# Patient Record
Sex: FEMALE | Race: BLACK OR AFRICAN AMERICAN | NOT HISPANIC OR LATINO | Employment: FULL TIME | URBAN - METROPOLITAN AREA
[De-identification: names, ages, dates, MRNs, and addresses within clinical notes are randomized per-mention and may not be internally consistent; named-entity substitution may affect disease eponyms.]

---

## 2019-04-12 ENCOUNTER — ALLIED HEALTH/NURSE VISIT (OUTPATIENT)
Dept: SURGERY | Facility: CLINIC | Age: 34
End: 2019-04-12
Payer: COMMERCIAL

## 2019-04-12 ENCOUNTER — OFFICE VISIT (OUTPATIENT)
Dept: ENDOCRINOLOGY | Facility: CLINIC | Age: 34
End: 2019-04-12
Payer: COMMERCIAL

## 2019-04-12 VITALS
HEART RATE: 83 BPM | SYSTOLIC BLOOD PRESSURE: 111 MMHG | WEIGHT: 205.9 LBS | BODY MASS INDEX: 34.3 KG/M2 | DIASTOLIC BLOOD PRESSURE: 60 MMHG | TEMPERATURE: 98.1 F | OXYGEN SATURATION: 100 % | RESPIRATION RATE: 18 BRPM | HEIGHT: 65 IN

## 2019-04-12 DIAGNOSIS — E66.811 OBESITY (BMI 30.0-34.9): Primary | ICD-10-CM

## 2019-04-12 RX ORDER — TOPIRAMATE 25 MG/1
TABLET, FILM COATED ORAL
Qty: 90 TABLET | Refills: 3 | Status: SHIPPED | OUTPATIENT
Start: 2019-04-12 | End: 2019-10-11

## 2019-04-12 RX ORDER — PHENTERMINE HYDROCHLORIDE 37.5 MG/1
TABLET ORAL
Qty: 15 TABLET | Refills: 3 | Status: SHIPPED | OUTPATIENT
Start: 2019-04-12 | End: 2019-10-04

## 2019-04-12 SDOH — HEALTH STABILITY: MENTAL HEALTH: HOW OFTEN DO YOU HAVE A DRINK CONTAINING ALCOHOL?: MONTHLY OR LESS

## 2019-04-12 ASSESSMENT — PAIN SCALES - GENERAL: PAINLEVEL: NO PAIN (0)

## 2019-04-12 ASSESSMENT — MIFFLIN-ST. JEOR: SCORE: 1634.84

## 2019-04-12 NOTE — NURSING NOTE
"Chief Complaint   Patient presents with     Weight Problem     Pt here for consult with weight management       Vitals:    04/12/19 0848   BP: 111/60   BP Location: Left arm   Patient Position: Sitting   Cuff Size: Adult Large   Pulse: 83   Resp: 18   Temp: 98.1  F (36.7  C)   TempSrc: Oral   SpO2: 100%   Weight: 93.4 kg (205 lb 14.4 oz)   Height: 1.651 m (5' 5\")       Body mass index is 34.26 kg/m .      ROOSEVELT EdmondsonT                      "
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
## 2019-04-12 NOTE — PATIENT INSTRUCTIONS
Nutrition Goals  1) Eat three meals per day, follow the plate method for portion sizes  2) If you are hungry between meals have a planned 100 calorie snack  3) Drink 48-64 oz of water per day  4) Continue exercising 3-4 days per week for 60 minutes    Follow up with RD in one month    Marixa Staton RD, LD  If you need to schedule or reschedule with a dietitian please call [phone].

## 2019-04-12 NOTE — LETTER
Date:April 30, 2019      Patient was self referred, no letter generated. Do not send.        [facility] Information

## 2019-04-12 NOTE — PATIENT INSTRUCTIONS
Welcome to our weight management program!   We are excited to join you on your weight loss journey    Thank you for allowing us the privilege of caring for you. We hope we provided you with the excellent service you deserve.    Please let us know if there is anything else we can do so that we can be sure you are leaving completely satisfied with your care experience.    You saw Dr. Alonso today.    Instructions per today's visit:   Medications started today: topiramate and phentermine (see below)  MTM pharmacist referral: in 1 month  Nutrition today    Follow up appointments:  3 months with Gavin; MTM with Bloch in 4 wks; nurse visit in 2 wks for blood pressure check    For any questions/concerns contact Helena Beckwith LPN at [phone] or Johanna Francisco RN at [phone]    To schedule appointments with our team, please call [phone]     Please call during clinic hours Monday through Friday 8:00a - 4:00p if you have questions or you can contact us via [employer] at anytime. ?    Lab results will be communicated through My Chart or letter (if My Chart not used). Please call the clinic if you have not received communication after 1 week or if you have any questions.?      Fax: [phone]    Thank you,  Medical Weight Management Team      MEDICATION STARTED AT THIS APPOINTMENT  We are starting topiramate at bedtime or supper.  Start one tab, 25 mg, for a week. Go up to 50 mg (2 tabs) for the next week. At the third week, take   3 tabs (75 mg).  Stay at 3 tabs until you are seen again. Call the nurse at [phone] if you have any questions or concerns. (Do not stop taking it if you don't think it's working. For some people it works even though they do not feel much different.)    Topiramate (Topamax) is a medication that is used most often to treat migraine headaches or for seizures. It has also been found to help with weight loss. Although it's not currently FDA approved for weight loss, it has been used  safely for a number of years to help people who are carrying extra weight.     Just how topiramate helps with weight loss has not been exactly determined. However it seems to work on areas of the brain to quiet down signals related to eating.      Topiramate may make you:    >feel less interest in eating in between meals   >think less about food and eating   >find it easier to push the plate away   >find giving up pop easier    >have an easier time eating less    For some of our patients, the pills work right away. They feel and think quite differently about food. Other patients don't feel much of a change but find in fact they have lost weight! Like all weight loss medications, topiramate works best when you help it work.  This means:    1) Have less tempting high calorie (fattening) food around the house or office    2) Have lower calorie food (fruits, vegetables,low fat meats and dairy) for snacks    3) Eat out only one time or less each week.   4) Eat your meals at a table with the TV or computer off.    Side-effects. Topiramate is generally well tolerated. The main side-effects we see are:   Tingling in hands,feet, or face (usually not very troublesome)   Mental confusion and word finding trouble (about 10% of patients have this.)     Feeling sleepy or a bit dopey- this goes away very soon after starting.    One of the dangers of topiramate is the possibility of birth defects--if you get pregnant when you are on it, there is the risk that your baby will be born with a cleft lip or palate.  If you are on topiramate and of child bearing age, you need to be on a reliable form of birth control or refrain from sexual intercourse.     Please refer to the pharmacy insert for more information on side-effects. Since many pharmacists are not familiar with the use of topiramate in weight loss, calling the clinic will get you the most accurate information on the use of this medication for weight loss.     In order to get  refills of this or any medication we prescribe you must be seen in the medical weight mgmt clinic every 2-3 months. Please have your pharmacy fax a refill request to [phone].      MEDICATION STARTED AT THIS APPOINTMENT    We are starting Phentermine. Take one-half tablet in the morning.  Call the nurse at [phone] if you have any questions or concerns. (Do not stop taking it if you don't think it's working. For some people it works without them knowing it.)    Phentermine is being prescribed because you identified hunger as one of the main causes for your extra weight.      Our patients on Phentermine find that they:    >feel less hunger    >find it easier to push the plate away   >have an easier time eating less    For some of our patients, these feelings are very real and immediate. For other patients, the feelings are less obvious. They don't feel much of a change but find they've lost weight. Like all weight loss medications, Phentermine  works best when you help it work. This means:  1. Having less tempting high calorie (fattening) food around the house or office. (For people with strong cravings this is very important.)   2. Staying away from situations or people that may trigger your cravings .   3. Eating out only one time or less each week.  4. Eating your meals at a table with the TV or computer off.    Side-effects. Phentermine is generally well tolerated. The main side-effects we see are feelings of racing pulse or rapid heart beat. Some people can get an elevated blood pressure. Because of this we may have you come back within a week or so of starting the medication for a blood pressure check.         In order to get refills of this or any medication we prescribe you must be seen in the medical weight mgmt clinic every 2-3 months. Please have your pharmacy fax a refill request to [phone].

## 2019-04-12 NOTE — PROGRESS NOTES
"Matt Gao is a 33 year old female presents today for new weight management nutrition consultation.  Patient was referred by Dr Alonso.    Estimated body mass index is 34.26 kg/m  as calculated from the following:    Height as of an earlier encounter on 4/12/19: 1.651 m (5' 5\").    Weight as of an earlier encounter on 4/12/19: 93.4 kg (205 lb 14.4 oz).     Nutrition history  See MD note for details.  Recent food recall:  Breakfast: skips - intermittent fasting eats 12pm-8pm  Lunch(12): protein porridge(protein world) with blueberries or almond milk, rice, chicken, sweet potatoes  Dinner(7): protein shake(protein world) with almond milk and occ fruit or kale  Snacks: chips and queso, popcorn, candy, gardettos, chex mix  Beverages: water, occ wine(once per week, 2-3 glasses)     Turns to food when stressed or upset  Patient is getting  in August and started working a second job in the last year, but is able to control hours and how much she works her second job.    Exercise: 3 days per week for 60 minutes    Nutrition Prescription  Decrease carbohydrate, increased lean protein (per MD)    Nutrition Diagnosis  Food and nutrition related knowledge deficit r/t lack of prior exposure to nutrition education aeb pt unable to verbalize understanding of portion sizes and meal/snack planning for weight loss.    Nutrition Intervention  Materials/education provided on portion control and healthy food choices (The plate method), 100 calorie snack choices.  Patient will snack during the day at work and reported consuming large portions of carbohydrates at meals.  Discussed having planned snacks of 100 calories available if hungry between meals.  Encouraged including lean protein at each meal and reducing carbohydrate portions.  Patient asked how much water she should be drinking encouraged 48-64 oz per day.  She currently goes to the gym 3 times per week since January and has not seen weight loss, discussed food " portions to help improve weight loss results.  Provided encouragement, support and RD contact information.    Patient Understanding: good  Expected Compliance: good  Follow-Up Plans: calorie goal/what to eat.     Nutrition Goals  1) Eat three meals per day, follow the plate method for portion sizes  2) If you are hungry between meals have a planned 100 calorie snack  3) Drink 48-64 oz of water per day  4) Continue exercising 3-4 days per week for 60 minutes    Follow-Up:  PRN    Time spent with patient: 30 minutes.  Marixa Staton, MARGIE, LD

## 2019-04-16 ENCOUNTER — TELEPHONE (OUTPATIENT)
Dept: PHARMACY | Facility: OTHER | Age: 34
End: 2019-04-16

## 2019-04-16 NOTE — TELEPHONE ENCOUNTER
MTM referral from: [facility] visit (referral by provider)    MTM referral outreach attempt #2 on April 16, 2019 at 3:00 PM      Outcome: Patient not reachable after several attempts, will route to MTM Pharmacist/Provider as an FYI. Thank you for the referral.    Hawa Dillon, MTM Coordinator

## 2019-04-19 ENCOUNTER — HEALTH MAINTENANCE LETTER (OUTPATIENT)
Age: 34
End: 2019-04-19

## 2019-04-25 ENCOUNTER — ALLIED HEALTH/NURSE VISIT (OUTPATIENT)
Dept: SURGERY | Facility: CLINIC | Age: 34
End: 2019-04-25
Payer: COMMERCIAL

## 2019-04-25 VITALS
SYSTOLIC BLOOD PRESSURE: 105 MMHG | HEART RATE: 66 BPM | WEIGHT: 196.8 LBS | DIASTOLIC BLOOD PRESSURE: 69 MMHG | BODY MASS INDEX: 32.75 KG/M2

## 2019-04-25 DIAGNOSIS — E66.9 OBESITY: Primary | ICD-10-CM

## 2019-04-25 NOTE — NURSING NOTE
Patient here today for weight, BP and pulse check per Dr. Alonso. Patient is currently taking Phentermine 18.75mg and Topiramate 50mg. Patient states she is having no side effects from either medication. She is planning on increasing Topiramate to 75mg this weekend. Patient states she is having occasional cravings after dinner and is still slightly groggy in the morning. Advised patient to try moving Topiramate dose to around dinner time to see if things improve. Patient understands and has no further questions. Patient is down 9lbs since initial visit on 4/12/19. Will inform Dr. Alonso.

## 2019-05-08 NOTE — PROGRESS NOTES
"Matt Gao is a 33 year old female presents today for return weight management nutrition consultation.  Patient was referred by Dr Alonso.    Estimated body mass index is 34.26 kg/m  as calculated from the following:    Height as of an earlier encounter on 4/12/19: 1.651 m (5' 5\").    Weight as of an earlier encounter on 4/12/19: 93.4 kg (205 lb 14.4 oz).   Current weight: 194.9 lbs (-11 lbs in the past month)      Nutrition history  Recent food recall:  Breakfast(12): oatmeal or pancakes with protein  Lunch: popcorn, yogurt, avocado, pepper  Dinner: skips  Snacks: chips occasionally   Beverages: water(not enough)     If eating out(fast food) will only have one meal per day  Not eating after 8 once she gets home from work    Progress with previous goals:  1) Eat three meals per day, follow the plate method for portion sizes continues   2) If you are hungry between meals have a planned 100 calorie snack not snacking as often   3) Drink 48-64 oz of water per day not enough   4) Continue exercising 3-4 days per week for 60 minutes at least 3 days for 60 minutes     Turns to food when stressed or upset  Patient is getting  in August and started working a second job in the last year, but is able to control hours and how much she works her second job.    Exercise: 3 days per week for 60 minutes    Nutrition Prescription  Decrease carbohydrate, increased lean protein (per MD)    Nutrition Diagnosis  Food and nutrition related knowledge deficit r/t lack of prior exposure to nutrition education aeb pt unable to verbalize understanding of portion sizes and meal/snack planning for weight loss.    Nutrition Intervention  Materials/education provided, reviewed previous goals.  Patient reported that she has decreased her snacking and portions at meal are more controlled however continues to be inconsistent with regular meals.  Discussed moving intermittent fasting earlier in the day to avoid additional calories in " the evenings.  She continues to consistently exercise 3 days per week.  Discussed ways to increase water intake as patient does not think she is drinking enough water.  Provided encouragement, support and RD contact information.    Patient Understanding: good  Expected Compliance: good  Follow-Up Plans: calorie goal/what to eat.     Nutrition Goals  1) Eat three meals per day, follow the plate method for portion sizes, shift intermittent fasting up earlier in the day, eat between 10 am and 6 pm  2) If you are hungry between meals have a planned 100 calorie snack  3) Drink 48-64 oz of water per day.  Take two bottles/pitchers of water to work  4) Continue exercising 3-4 days per week for 60 minutes    Follow-Up:  PRN    Time spent with patient: 15 minutes.  Marixa Staton, MARGIE, LD

## 2019-05-10 ENCOUNTER — TELEPHONE (OUTPATIENT)
Dept: ENDOCRINOLOGY | Facility: CLINIC | Age: 34
End: 2019-05-10

## 2019-05-10 ENCOUNTER — ALLIED HEALTH/NURSE VISIT (OUTPATIENT)
Dept: SURGERY | Facility: CLINIC | Age: 34
End: 2019-05-10
Payer: COMMERCIAL

## 2019-05-10 VITALS — WEIGHT: 194.9 LBS | BODY MASS INDEX: 32.43 KG/M2

## 2019-05-10 NOTE — PATIENT INSTRUCTIONS
Nutrition Goals  1) Eat three meals per day, follow the plate method for portion sizes, shift intermittent fasting up earlier in the day, eat between 10 am and 6 pm  2) If you are hungry between meals have a planned 100 calorie snack  3) Drink 48-64 oz of water per day.  Take two bottles/pitchers of water to work  4) Continue exercising 3-4 days per week for 60 minutes    Follow up with RD in one month    Marixa Staton RD, LD  If you need to schedule or reschedule with a dietitian please call [phone].

## 2019-05-10 NOTE — TELEPHONE ENCOUNTER
Called and left message for patient in regards to medication check. Patient was seen for nurse visit last month, was still titrating Topiramate dose. Left direct number for call back to see how things are going.

## 2019-08-17 DIAGNOSIS — E66.811 OBESITY (BMI 30.0-34.9): ICD-10-CM

## 2019-08-20 RX ORDER — TOPIRAMATE 25 MG/1
TABLET, FILM COATED ORAL
Qty: 90 TABLET | Refills: 3 | OUTPATIENT
Start: 2019-08-20

## 2019-08-20 NOTE — TELEPHONE ENCOUNTER
Recieved refill request for Topiramate. Patient needs appointment scheduled prior to any refills. Clinic Coordinator notified and will follow up with the patient as appropriate. The pharmacy has been notified that the medication will not be refilled prior to an appointment being scheduled.  LVD 4/12/2019

## 2019-10-04 ENCOUNTER — OFFICE VISIT (OUTPATIENT)
Dept: ENDOCRINOLOGY | Facility: CLINIC | Age: 34
End: 2019-10-04
Payer: COMMERCIAL

## 2019-10-04 VITALS
TEMPERATURE: 98.3 F | BODY MASS INDEX: 31.75 KG/M2 | SYSTOLIC BLOOD PRESSURE: 117 MMHG | OXYGEN SATURATION: 100 % | HEIGHT: 65 IN | WEIGHT: 190.6 LBS | DIASTOLIC BLOOD PRESSURE: 70 MMHG | HEART RATE: 73 BPM

## 2019-10-04 DIAGNOSIS — E66.811 OBESITY (BMI 30.0-34.9): Primary | ICD-10-CM

## 2019-10-04 RX ORDER — PHENTERMINE HYDROCHLORIDE 37.5 MG/1
TABLET ORAL
Qty: 45 TABLET | Refills: 1 | Status: SHIPPED | OUTPATIENT
Start: 2019-10-04 | End: 2020-05-26

## 2019-10-04 ASSESSMENT — MIFFLIN-ST. JEOR: SCORE: 1560.44

## 2019-10-04 ASSESSMENT — PAIN SCALES - GENERAL: PAINLEVEL: NO PAIN (0)
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
## 2019-10-04 NOTE — PATIENT INSTRUCTIONS
Welcome to our weight management program!   We are excited to join you on your weight loss journey    Thank you for allowing us the privilege of caring for you. We hope we provided you with the excellent service you deserve.    Please let us know if there is anything else we can do so that we can be sure you are leaving completely satisfied with your care experience.    You saw Dr. Alonso today.    Instructions per today's visit:   Medications started today: will continue with the same meds at same doses  MTM pharmacist referral: in about 8 wks      Follow up appointments:  Lauren Bloch (pharmacist) in about 2 mo and Gavin in about 4 mo    Interested in working with a health ?  Health coaches work with you to improve your overall health and wellbeing.  They look at the whole person, and may involve discussion of different areas of life, including, but not limited to the four pillars of health (sleep, exercise, nutrition, and stress management). Discuss with your care team if you would like to start working a health .  Health Coaching-3 Pack:    $99 for three health coaching visits    Visits may be done in person or via phone    Coaching is a partnership between the  and the client; Coaches do not prescribe or diagnosis    Coaching helps inspire the client to reach his/her personal goals     For any questions/concerns contact Helena Beckwith LPN at [phone] or Johanna Francisco RN at [phone]    To schedule appointments with our team, please call [phone]     Please call during clinic hours Monday through Friday 8:00a - 4:00p if you have questions or you can contact us via [employer] at anytime. ?    Lab results will be communicated through My Chart or letter (if My Chart not used). Please call the clinic if you have not received communication after 1 week or if you have any questions.?      Fax: [phone]    Thank you,  Medical Weight Management Team      Nutrition Goals  1) Eat three  meals per day, follow the plate method for portion sizes  2) If you are hungry between meals have a planned 100 calorie snack  3) Drink 48-64 oz of water per day  4) Continue exercising 3-4 days per week for 60 minutes

## 2019-10-04 NOTE — NURSING NOTE
"Chief Complaint   Patient presents with     RECHECK     Follow up for weight management.       Vitals:    10/04/19 0913   BP: 117/70   BP Location: Left arm   Patient Position: Sitting   Cuff Size: Adult Regular   Pulse: 73   Temp: 98.3  F (36.8  C)   TempSrc: Oral   SpO2: 100%   Weight: 86.5 kg (190 lb 9.6 oz)   Height: 1.651 m (5' 5\")       Body mass index is 31.72 kg/m .                     FABIÁN SANCHEZ, EMT    "

## 2019-10-04 NOTE — LETTER
Date:October 14, 2019      Patient was self referred, no letter generated. Do not send.        [facility] Information

## 2019-10-08 ENCOUNTER — TELEPHONE (OUTPATIENT)
Dept: ENDOCRINOLOGY | Facility: CLINIC | Age: 34
End: 2019-10-08

## 2019-10-08 NOTE — TELEPHONE ENCOUNTER
MTM referral from: [facility] visit (referral by provider)    MTM referral outreach attempt #2 on October 8, 2019 at 1:01 PM      Outcome: Patient not reachable after several attempts, will route to MTM Pharmacist/Provider as an FYI. Thank you for the referral.    See [facility] Pharmacy Coordinator

## 2019-10-11 RX ORDER — TOPIRAMATE 25 MG/1
TABLET, FILM COATED ORAL
Qty: 270 TABLET | Refills: 3 | Status: SHIPPED | OUTPATIENT
Start: 2019-10-11 | End: 2021-08-19 | Stop reason: DRUGHIGH

## 2019-10-16 DIAGNOSIS — E66.811 OBESITY (BMI 30.0-34.9): ICD-10-CM

## 2019-10-17 RX ORDER — PHENTERMINE HYDROCHLORIDE 37.5 MG/1
TABLET ORAL
Qty: 15 TABLET | Refills: 0
Start: 2019-10-17

## 2019-10-17 NOTE — TELEPHONE ENCOUNTER
Patient replied back to [employer] message stating she did not receive hard copy at time of visit. Called into pharmacy.

## 2019-10-17 NOTE — TELEPHONE ENCOUNTER
Message out to patient to see if she was given a hard copy script at time of visit on 10/4. Refill denied at this time. Will wait for patient response.

## 2020-03-11 ENCOUNTER — HEALTH MAINTENANCE LETTER (OUTPATIENT)
Age: 35
End: 2020-03-11

## 2020-05-22 DIAGNOSIS — E66.811 OBESITY (BMI 30.0-34.9): ICD-10-CM

## 2020-05-26 NOTE — TELEPHONE ENCOUNTER
PHENTERMINE 37.5 MG TABLET      Last Written Prescription Date:  10/4/2019  Last Fill Quantity: 45,   # refills: 1  Last Office Visit : 10/4/2019  Future Office visit:  None    Routing refill request to provider for review/approval because:  Drug not on the [facility], [facility] or [facility] refill protocol or controlled substance      Precious Ding RN  Central Triage Red Flags/Med Refills

## 2020-05-29 RX ORDER — PHENTERMINE HYDROCHLORIDE 37.5 MG/1
0.5 TABLET ORAL
Qty: 45 TABLET | Refills: 1 | Status: SHIPPED | OUTPATIENT
Start: 2020-05-29 | End: 2021-08-19

## 2021-01-03 ENCOUNTER — HEALTH MAINTENANCE LETTER (OUTPATIENT)
Age: 36
End: 2021-01-03

## 2021-04-25 ENCOUNTER — HEALTH MAINTENANCE LETTER (OUTPATIENT)
Age: 36
End: 2021-04-25

## 2021-07-16 DIAGNOSIS — E66.811 OBESITY (BMI 30.0-34.9): ICD-10-CM

## 2021-07-16 RX ORDER — PHENTERMINE HYDROCHLORIDE 37.5 MG/1
0.5 TABLET ORAL
Qty: 45 TABLET | Refills: 1 | OUTPATIENT
Start: 2021-07-16

## 2021-07-16 RX ORDER — TOPIRAMATE 25 MG/1
TABLET, FILM COATED ORAL
Qty: 270 TABLET | Refills: 3 | OUTPATIENT
Start: 2021-07-16

## 2021-07-16 NOTE — TELEPHONE ENCOUNTER
Patient has not had these prescribed since 2020 and needs a follow up appointment in order to restart.

## 2021-07-16 NOTE — TELEPHONE ENCOUNTER
"Call Center:  Hi. I always check with PT on pharm. I put (poorly abbreviated) \"Pharm of record is correct\". So, the currently listed ones in her chart for those meds\"        phentermine (ADIPEX-P) 37.5 MG tablet,      Last Written Prescription Date:  5-29-20  Last Fill Quantity: 45,   # refills: 1  Last Office Visit : 10-4-19 ( [place])  Future Office visit:  7-22-21 ( [place])    Routing refill request to provider for review/approval because:  Drug not on the [facility], [facility] or [facility] refill protocol or controlled substance  Gap in RF  Seeing new provider          topiramate (TOPAMAX) 25 MG tablet  Last Written Prescription Date:  10-11-19  Last Fill Quantity: 270,   # refills: 3      Routing refill request to provider for review/approval because:  Gap in RF  Seeing new provider          "

## 2021-07-16 NOTE — TELEPHONE ENCOUNTER
Pt out of phentermine and topirimate, Phar of record is correct. Has appt with Dr. Dean next Thur (Gavin too far out).  Understands prob not filled till this appt.    [phone]  **OK to leave detailed VM

## 2021-07-22 ENCOUNTER — VIRTUAL VISIT (OUTPATIENT)
Dept: ENDOCRINOLOGY | Facility: CLINIC | Age: 36
End: 2021-07-22
Payer: COMMERCIAL

## 2021-07-22 VITALS — HEIGHT: 65 IN | WEIGHT: 225 LBS | BODY MASS INDEX: 37.49 KG/M2

## 2021-07-22 DIAGNOSIS — E66.812 CLASS 2 OBESITY: Primary | ICD-10-CM

## 2021-07-22 PROCEDURE — 99214 OFFICE O/P EST MOD 30 MIN: CPT | Mod: GT | Performed by: INTERNAL MEDICINE

## 2021-07-22 RX ORDER — METFORMIN HCL 500 MG
TABLET, EXTENDED RELEASE 24 HR ORAL
Qty: 90 TABLET | Refills: 3 | Status: SHIPPED | OUTPATIENT
Start: 2021-07-22 | End: 2022-04-08 | Stop reason: ALTCHOICE

## 2021-07-22 ASSESSMENT — PAIN SCALES - GENERAL: PAINLEVEL: NO PAIN (0)

## 2021-07-22 ASSESSMENT — MIFFLIN-ST. JEOR: SCORE: 1706.47

## 2021-07-22 NOTE — PROGRESS NOTES
"Return Medical Weight Management Note  Matt Gao  MRN:  [id number]  :  1985  FADI:  2021    Dear No Ref-Primary, Physician,    I had the pleasure of seeing your patient Matt Gao for follow-up consultation.  She is a 36 year old female who I am continuing to see for treatment of obesity related to:       4/10/2019   I have the following health issues associated with obesity: High Cholesterol   I have the following symptoms associated with obesity: Knee Pain     INTERVAL HISTORY:     CURRENT WEIGHT:   225 lbs 0 oz    Height:  5' 5\"  Body Mass Index:  Body mass index is 37.44 kg/m .  Vitals:  B/P: Data Unavailable, P: Data Unavailable, R: Data Unavailable     Diet Recall Review with Patient 4/10/2019   Do you typically eat breakfast? No   Do you typically eat lunch? Yes   If you do eat lunch, what types of food do you typically eat?  rice and other carbs   Do you typically eat supper? Yes   If you do eat supper, what types of food do you typically eat? rice and other carbs   Do you typically eat snacks? Yes   If you do snack, what types of food do you typically eat? snack mix, cookies, crackers and chips   How many glasses of juice do you drink in a typical day? 0   How many of glasses of milk do you drink in a typical day? 1   How many 8oz glasses of sugar containing drinks such as [employer]/sweet tea do you drink in a day? 0   How many cans/bottles of sugar pop/soda/tea/sports drinks do you drink in a day? 0   How many cans/bottles of diet pop/soda/tea or sports drink do you drink in a day? 0   How often do you have a drink of alcohol? 2-4 Times a Month   If you do drink, how many drinks might you have in a day? 3-4     MEDICATIONS:   Current Outpatient Medications   Medication     metFORMIN (GLUCOPHAGE-XR) 500 MG 24 hr tablet     phentermine (ADIPEX-P) 37.5 MG tablet     Semaglutide, 1 MG/DOSE, 4 MG/3ML SOPN     topiramate (TOPAMAX) 25 MG tablet     No current facility-administered " "medications for this visit.       Weight Loss Medication History Reviewed With Patient 8/19/2021   Which weight loss medications are you currently taking on a regular basis?  None   If you are not taking a weight loss medication that was prescribed to you, please indicate why: It did not seem to be helping me, My insurance does not cover the cost   Are you having any side effects from the weight loss medication that we have prescribed you? -       LABS:  No results found for: A1C  No results found for: CHOL  No results found for: TSH  No results found for: CR  No results found for: ALT    ASSESSMENT:  Ms. Gao is a 36 year old female with history of Class 2 obesity with current body mass index is 37.44 kg/m . and with current health consequences who presents for weight management follow-up consultation.     The following diagnoses are relevant to Matt Gao's history of obesity:     PLAN:    Problem   Class 2 Obesity    July 2021: My first time meeting Ms Gao, previously she had seen Dr. Alonso. She is trying to get pregnant. We will start metformin and semaglutide.          With motivational interviewing, Matt took part in making the following plan:  Patient Instructions   Nice to meet you!    Metformin: Is a very safe medication and most patients do not get side effects. It is considered as \"off label\" use for weight loss and fertility treatment.   - It can be taken any time of the day.   - Metformin can decrease appetite, so the least helpful time of the day to take it would be right before bed. We try to take it in the morning and afternoon or early evening if possible.   - You can take it with or without food  - If you get side effects (diarrhea or stomach upset), try taking it at the end of a meal.  If you don't get side effects, you can take it anytime you want.   Dosing: For the 1st week, take 500mg in the evening   ---- For the 2nd week, take 1,000mg (2 tablets) daily   ---- For the " "3rd week, take 1,000mg (2 tablets in the evening), and 500mg in the morning    Wegovy (Semaglutide) -- it would be good for your  too.   1.  Start Wegovy (semaglutide) 0.25 mg once weekly for 4 weeks, then if tolerating increase to 0.5 mg weekly for 4 weeks.  -Each Wegovy pen is a different color to help identify the different dose strengths   -Each Wegovy pen is a once weekly single-dose prefilled pen with a pen injector already built within the pen. Discard the Wegovy pen after use in sharps container.     2. Storage: make sure that when you get the prescription that you store the prescription in the refrigerator until it is time to use the Wegovy pen.  Once it is time to use the Wegovy pen, you can keep the pen at room temperature and it is good for up to 28 days at room temperature. D    3.  Potential common side effects: nausea, headache, diarrhea, stomach upset.  If these become unmanageable or concerning symptoms, please make sure to call or mychart.    WEGOVY SAVINGS CARD INFORMATION IF YOUR COPAY FOR WEGOVY IS MORE THAN $25 (or up to $200 off per 30  day supply):     This savings card can assist with cost savings for up to 6 fills of the prescription. This savings card is not allowed for those with government/state/federal insurance (Medicare, Medicaid, etc). If you have access to a computer, you can get the Wegovy savings coupon by following the below information:     1. Go to this website: https://www.[employer]/content/novocare/en/hcp/obesity/special-savings-offer.html?utm_source=GetWegovy&utm_medium=Vanity_URL&utm_campaign=Affordability_Program&utm_content=One_Pager&qwz=459033833     2. Check the box if you can certify that the attached comment is accurate, then select \"get card now\" and follow prompts.    3. Select the option you would like to select to print, download or email to acquire the savings card and bring that information to your pharmacy. The pharmacy will use that information to " determine your new cost of Wegovy prescription.     Carmen Barrios           FOLLOW-UP:    4 weeks.    30 minutes spent on the date of the encounter doing chart review, history and exam, documentation and further activities as noted above.    Thank you for including me in the care of your patient.  Please do not hesitate to call with questions or concerns.    Sincerely,    Tracey Dean MD MPH  Diplomate, American Board of Obesity Medicine, American Board of Internal Medicine, American Board of Pediatrics    Departments of Internal Medicine and Pediatrics  [facility]        [unfilled]       VINCE is a 36 year old who is being evaluated via a billable video visit.      How would you like to obtain your AVS? MyChart  If the video visit is dropped, the invitation should be resent by: Text to cell phone: [phone]  Will anyone else be joining your video visit? No      Video 30 min    [place] Location (provider location):  [facility] [place]     Platform used for Video Visit: [employer]

## 2021-07-22 NOTE — NURSING NOTE
"Chief Complaint   Patient presents with     Follow Up     Follow-up Weight Management       Vitals:    07/22/21 1426   Weight: 102.1 kg (225 lb)   Height: 1.651 m (5' 5\")       Body mass index is 37.44 kg/m .                            "

## 2021-07-22 NOTE — LETTER
"2021       RE: Matt Gao  [address]  [place] [address]     Dear Colleague,    Thank you for referring your patient, Matt Gao, to the [facility] WEIGHT MANAGEMENT CLINIC [place] at [facility]. Please see a copy of my visit note below.    Return Medical Weight Management Note  Matt Gao  MRN:  [id number]  :  1985  FADI:  2021    Dear No Ref-Primary, Physician,    I had the pleasure of seeing your patient Matt Gao for follow-up consultation.  She is a 36 year old female who I am continuing to see for treatment of obesity related to:       4/10/2019   I have the following health issues associated with obesity: High Cholesterol   I have the following symptoms associated with obesity: Knee Pain     INTERVAL HISTORY:     CURRENT WEIGHT:   225 lbs 0 oz    Height:  5' 5\"  Body Mass Index:  Body mass index is 37.44 kg/m .  Vitals:  B/P: Data Unavailable, P: Data Unavailable, R: Data Unavailable     Diet Recall Review with Patient 4/10/2019   Do you typically eat breakfast? No   Do you typically eat lunch? Yes   If you do eat lunch, what types of food do you typically eat?  rice and other carbs   Do you typically eat supper? Yes   If you do eat supper, what types of food do you typically eat? rice and other carbs   Do you typically eat snacks? Yes   If you do snack, what types of food do you typically eat? snack mix, cookies, crackers and chips   How many glasses of juice do you drink in a typical day? 0   How many of glasses of milk do you drink in a typical day? 1   How many 8oz glasses of sugar containing drinks such as Blake-Aid/sweet tea do you drink in a day? 0   How many cans/bottles of sugar pop/soda/tea/sports drinks do you drink in a day? 0   How many cans/bottles of diet pop/soda/tea or sports drink do you drink in a day? 0   How often do you have a drink of alcohol? 2-4 Times a Month " "  If you do drink, how many drinks might you have in a day? 3-4     MEDICATIONS:   Current Outpatient Medications   Medication     metFORMIN (GLUCOPHAGE-XR) 500 MG 24 hr tablet     phentermine (ADIPEX-P) 37.5 MG tablet     Semaglutide, 1 MG/DOSE, 4 MG/3ML SOPN     topiramate (TOPAMAX) 25 MG tablet     No current facility-administered medications for this visit.       Weight Loss Medication History Reviewed With Patient 8/19/2021   Which weight loss medications are you currently taking on a regular basis?  None   If you are not taking a weight loss medication that was prescribed to you, please indicate why: It did not seem to be helping me, My insurance does not cover the cost   Are you having any side effects from the weight loss medication that we have prescribed you? -       LABS:  No results found for: A1C  No results found for: CHOL  No results found for: TSH  No results found for: CR  No results found for: ALT    ASSESSMENT:  Ms. Gao is a 36 year old female with history of Class 2 obesity with current body mass index is 37.44 kg/m . and with current health consequences who presents for weight management follow-up consultation.     The following diagnoses are relevant to Matt Gao's history of obesity:     PLAN:    Problem   Class 2 Obesity    July 2021: My first time meeting Ms Gao, previously she had seen Dr. Alonso. She is trying to get pregnant. We will start metformin and semaglutide.          With motivational interviewing, Matt took part in making the following plan:  Patient Instructions   Nice to meet you!    Metformin: Is a very safe medication and most patients do not get side effects. It is considered as \"off label\" use for weight loss and fertility treatment.   - It can be taken any time of the day.   - Metformin can decrease appetite, so the least helpful time of the day to take it would be right before bed. We try to take it in the morning and afternoon or early evening if " possible.   - You can take it with or without food  - If you get side effects (diarrhea or stomach upset), try taking it at the end of a meal.  If you don't get side effects, you can take it anytime you want.   Dosing: For the 1st week, take 500mg in the evening   ---- For the 2nd week, take 1,000mg (2 tablets) daily   ---- For the 3rd week, take 1,000mg (2 tablets in the evening), and 500mg in the morning    Wegovy (Semaglutide) -- it would be good for your  too.   1.  Start Wegovy (semaglutide) 0.25 mg once weekly for 4 weeks, then if tolerating increase to 0.5 mg weekly for 4 weeks.  -Each Wegovy pen is a different color to help identify the different dose strengths   -Each Wegovy pen is a once weekly single-dose prefilled pen with a pen injector already built within the pen. Discard the Wegovy pen after use in sharps container.     2. Storage: make sure that when you get the prescription that you store the prescription in the refrigerator until it is time to use the Wegovy pen.  Once it is time to use the Wegovy pen, you can keep the pen at room temperature and it is good for up to 28 days at room temperature. D    3.  Potential common side effects: nausea, headache, diarrhea, stomach upset.  If these become unmanageable or concerning symptoms, please make sure to call or mychart.    WEGOVY SAVINGS CARD INFORMATION IF YOUR COPAY FOR WEGOVY IS MORE THAN $25 (or up to $200 off per 30  day supply):     This savings card can assist with cost savings for up to 6 fills of the prescription. This savings card is not allowed for those with government/state/federal insurance (Medicare, Medicaid, etc). If you have access to a computer, you can get the Wegovy savings coupon by following the below information:     1. Go to this website:  "https://www.[employer].Get In/content/novocare/en/hcp/obesity/special-savings-offer.html?utm_source=GetWegovy&utm_medium=Vanity_URL&utm_campaign=Affordability_Program&utm_content=One_Pager&tnh=922506938     2. Check the box if you can certify that the attached comment is accurate, then select \"get card now\" and follow prompts.    3. Select the option you would like to select to print, download or email to acquire the savings card and bring that information to your pharmacy. The pharmacy will use that information to determine your new cost of Wegovy prescription.     Carmen Barrios           FOLLOW-UP:    4 weeks.    30 minutes spent on the date of the encounter doing chart review, history and exam, documentation and further activities as noted above.    Thank you for including me in the care of your patient.  Please do not hesitate to call with questions or concerns.    Sincerely,    Tracey Dean MD MPH  Diplomate, American Board of Obesity Medicine, American Board of Internal Medicine, American Board of Pediatrics    Departments of Internal Medicine and Pediatrics  [facility]        [unfilled]       VINCE is a 36 year old who is being evaluated via a billable video visit.      How would you like to obtain your AVS? MyChart  If the video visit is dropped, the invitation should be resent by: Text to cell phone: [phone]  Will anyone else be joining your video visit? No      Video 30 min    Distant Location (provider location):  [facility] WEIGHT MANAGEMENT CLINIC [place]     Platform used for Video Visit: [facility]      Again, thank you for allowing me to participate in the care of your patient.      Sincerely,    Tracey Dean MD      "

## 2021-07-22 NOTE — PATIENT INSTRUCTIONS
"Nice to meet you!    Metformin: Is a very safe medication and most patients do not get side effects. It is considered as \"off label\" use for weight loss and fertility treatment.   - It can be taken any time of the day.   - Metformin can decrease appetite, so the least helpful time of the day to take it would be right before bed. We try to take it in the morning and afternoon or early evening if possible.   - You can take it with or without food  - If you get side effects (diarrhea or stomach upset), try taking it at the end of a meal.  If you don't get side effects, you can take it anytime you want.   Dosing: For the 1st week, take 500mg in the evening   ---- For the 2nd week, take 1,000mg (2 tablets) daily   ---- For the 3rd week, take 1,000mg (2 tablets in the evening), and 500mg in the morning    Wegovy (Semaglutide) -- it would be good for your  too.   1.  Start Wegovy (semaglutide) 0.25 mg once weekly for 4 weeks, then if tolerating increase to 0.5 mg weekly for 4 weeks.  -Each Wegovy pen is a different color to help identify the different dose strengths   -Each Wegovy pen is a once weekly single-dose prefilled pen with a pen injector already built within the pen. Discard the Wegovy pen after use in sharps container.     2. Storage: make sure that when you get the prescription that you store the prescription in the refrigerator until it is time to use the Wegovy pen.  Once it is time to use the Wegovy pen, you can keep the pen at room temperature and it is good for up to 28 days at room temperature. D    3.  Potential common side effects: nausea, headache, diarrhea, stomach upset.  If these become unmanageable or concerning symptoms, please make sure to call or mychart.    WEGOVY SAVINGS CARD INFORMATION IF YOUR COPAY FOR WEGOVY IS MORE THAN $25 (or up to $200 off per 30  day supply):     This savings card can assist with cost savings for up to 6 fills of the prescription. This savings card is not " "allowed for those with government/state/federal insurance (Medicare, Medicaid, etc). If you have access to a computer, you can get the Wegovy savings coupon by following the below information:     1. Go to this website: https://www.[employer]ova[employer]/content/[employer]/en/hcp/obesity/special-savings-offer.html?utm_source=GetWegovy&utm_medium=Vanity_URL&utm_campaign=Affordability_Program&utm_content=One_Pager&ahw=753087828     2. Check the box if you can certify that the attached comment is accurate, then select \"get card now\" and follow prompts.    3. Select the option you would like to select to print, download or email to acquire the savings card and bring that information to your pharmacy. The pharmacy will use that information to determine your new cost of Wegovy prescription.     Carmen Barrios       "

## 2021-07-22 NOTE — LETTER
Date:August 28, 2021      Patient was self referred, no letter generated. Do not send.        [facility] Health Information

## 2021-08-19 ENCOUNTER — VIRTUAL VISIT (OUTPATIENT)
Dept: ENDOCRINOLOGY | Facility: CLINIC | Age: 36
End: 2021-08-19
Payer: COMMERCIAL

## 2021-08-19 VITALS — HEIGHT: 65 IN | WEIGHT: 235 LBS | BODY MASS INDEX: 39.15 KG/M2

## 2021-08-19 DIAGNOSIS — E66.812 CLASS 2 OBESITY: ICD-10-CM

## 2021-08-19 DIAGNOSIS — E66.811 OBESITY (BMI 30.0-34.9): ICD-10-CM

## 2021-08-19 PROCEDURE — 99213 OFFICE O/P EST LOW 20 MIN: CPT | Mod: GT | Performed by: INTERNAL MEDICINE

## 2021-08-19 RX ORDER — PHENTERMINE HYDROCHLORIDE 37.5 MG/1
0.5 TABLET ORAL
Qty: 45 TABLET | Refills: 1 | Status: SHIPPED | OUTPATIENT
Start: 2021-08-19 | End: 2022-03-24

## 2021-08-19 ASSESSMENT — MIFFLIN-ST. JEOR: SCORE: 1751.83

## 2021-08-19 ASSESSMENT — PAIN SCALES - GENERAL: PAINLEVEL: NO PAIN (0)

## 2021-08-19 NOTE — PATIENT INSTRUCTIONS
"Call your insurance and see what they cover for weight loss medications    Ask if \"Wegovy\" is covered now that semaglutide is FDA approved for weight loss.  - If not, ask them if Saxenda is covered for weight loss.   "

## 2021-08-19 NOTE — PROGRESS NOTES
"Return Medical Weight Management Note  Matt Gao  MRN:  [id number]  :  1985  FADI:  2021    Dear No Ref-Primary, Physician,    I had the pleasure of seeing your patient Matt Gao for follow-up consultation.  She is a 36 year old female who I am continuing to see for treatment of obesity related to:       4/10/2019   I have the following health issues associated with obesity: High Cholesterol   I have the following symptoms associated with obesity: Knee Pain     INTERVAL HISTORY:     CURRENT WEIGHT:   235 lbs 0 oz    Wt Readings from Last 4 Encounters:   21 106.6 kg (235 lb)   21 102.1 kg (225 lb)   10/04/19 86.5 kg (190 lb 9.6 oz)   05/10/19 88.4 kg (194 lb 14.4 oz)       Height:  5' 5\"  Body Mass Index:  Body mass index is 39.11 kg/m .  Vitals:  B/P: Data Unavailable, P: Data Unavailable, R: Data Unavailable     Starting weight in weight management with Dr. Dean was: 225    Diet Recall Review with Patient 4/10/2019   Do you typically eat breakfast? No   Do you typically eat lunch? Yes   If you do eat lunch, what types of food do you typically eat?  rice and other carbs   Do you typically eat supper? Yes   If you do eat supper, what types of food do you typically eat? rice and other carbs   Do you typically eat snacks? Yes   If you do snack, what types of food do you typically eat? snack mix, cookies, crackers and chips   How many glasses of juice do you drink in a typical day? 0   How many of glasses of milk do you drink in a typical day? 1   How many 8oz glasses of sugar containing drinks such as Blake-Aid/sweet tea do you drink in a day? 0   How many cans/bottles of sugar pop/soda/tea/sports drinks do you drink in a day? 0   How many cans/bottles of diet pop/soda/tea or sports drink do you drink in a day? 0   How often do you have a drink of alcohol? 2-4 Times a Month   If you do drink, how many drinks might you have in a day? 3-4       MEDICATIONS:   Current " "Outpatient Medications   Medication     metFORMIN (GLUCOPHAGE-XR) 500 MG 24 hr tablet     phentermine (ADIPEX-P) 37.5 MG tablet     Semaglutide, 1 MG/DOSE, 4 MG/3ML SOPN     No current facility-administered medications for this visit.       Weight Loss Medication History Reviewed With Patient 8/19/2021   Which weight loss medications are you currently taking on a regular basis?  None   If you are not taking a weight loss medication that was prescribed to you, please indicate why: It did not seem to be helping me, My insurance does not cover the cost   Are you having any side effects from the weight loss medication that we have prescribed you? -       LABS:  No results found for: A1C  No results found for: CHOL  No results found for: TSH  No results found for: CR  No results found for: ALT    ASSESSMENT:  Ms. Gao is a 36 year old female with history of Class 2 obesity with current body mass index is 39.11 kg/m . and with current health consequences who presents for weight management follow-up consultation.  The following diagnoses are relevant to Matt Gao's history of obesity:     PLAN:    Problem   Class 2 Obesity    July 2021: My first time meeting Ms Gao, previously she had seen Dr. Alonso. She is trying to get pregnant. We will start metformin and semaglutide.     Aug 2021: Was only able to start the metformin. Takes 3 pills at once. Discussed taking metformin twice daily instead. Will start phentermine. Discussed health coaching. She will look into it through her job.         No problem-specific Assessment & Plan notes found for this encounter.      No orders of the defined types were placed in this encounter.       With motivational interviewing, Matt took part in making the following plan:  Patient Instructions   Call your insurance and see what they cover for weight loss medications    Ask if \"Wegovy\" is covered now that semaglutide is FDA approved for weight loss.  - If not, ask " them if Saxenda is covered for weight loss.       FOLLOW-UP:    4 weeks.    20 minutes spent on the date of the encounter doing chart review, history and exam, documentation and further activities as noted above.    Thank you for including me in the care of your patient.  Please do not hesitate to call with questions or concerns.    Sincerely,    Tracey Dean MD MPH  Diplomate, American Board of Obesity Medicine, American Board of Internal Medicine, American Board of Pediatrics    Departments of Internal Medicine and Pediatrics  [facility]        [unfilled]       VINCE is a 36 year old who is being evaluated via a billable video visit.      How would you like to obtain your AVS? MyChart  If the video visit is dropped, the invitation should be resent by: Text to cell phone: [phone]  Will anyone else be joining your video visit? No       During this virtual visit the patient is located in [place], patient verifies this as the location during the entirety of this visit.       Video Start Time: 1:10  Video-Visit Details    Type of service:  Video Visit    Video End Time:1:27 PM    Originating Location (pt. Location): Home    Distant Location (provider location):  [facility] WEIGHT MANAGEMENT CLINIC [place]     Platform used for Video Visit: [email or web address]

## 2021-08-19 NOTE — NURSING NOTE
"Chief Complaint   Patient presents with     Follow Up     [facility] follow up       Vitals:    08/19/21 1235   Weight: 106.6 kg (235 lb)   Height: 1.651 m (5' 5\")       Body mass index is 39.11 kg/m .                            "

## 2021-08-19 NOTE — LETTER
Date:August 28, 2021      Patient was self referred, no letter generated. Do not send.        [facility] Health Information

## 2021-08-19 NOTE — LETTER
"2021       RE: Matt Gao  [address]  [place] [address]     Dear Colleague,    Thank you for referring your patient, Matt Gao, to the [facility] WEIGHT MANAGEMENT CLINIC [place] at [facility]. Please see a copy of my visit note below.    Return Medical Weight Management Note  Matt Gao  MRN:  [id number]  :  1985  FADI:  2021    Dear No Ref-Primary, Physician,    I had the pleasure of seeing your patient Matt Gao for follow-up consultation.  She is a 36 year old female who I am continuing to see for treatment of obesity related to:       4/10/2019   I have the following health issues associated with obesity: High Cholesterol   I have the following symptoms associated with obesity: Knee Pain     INTERVAL HISTORY:     CURRENT WEIGHT:   235 lbs 0 oz    Wt Readings from Last 4 Encounters:   21 106.6 kg (235 lb)   21 102.1 kg (225 lb)   10/04/19 86.5 kg (190 lb 9.6 oz)   05/10/19 88.4 kg (194 lb 14.4 oz)       Height:  5' 5\"  Body Mass Index:  Body mass index is 39.11 kg/m .  Vitals:  B/P: Data Unavailable, P: Data Unavailable, R: Data Unavailable     Starting weight in weight management with Dr. Dean was: 225    Diet Recall Review with Patient 4/10/2019   Do you typically eat breakfast? No   Do you typically eat lunch? Yes   If you do eat lunch, what types of food do you typically eat?  rice and other carbs   Do you typically eat supper? Yes   If you do eat supper, what types of food do you typically eat? rice and other carbs   Do you typically eat snacks? Yes   If you do snack, what types of food do you typically eat? snack mix, cookies, crackers and chips   How many glasses of juice do you drink in a typical day? 0   How many of glasses of milk do you drink in a typical day? 1   How many 8oz glasses of sugar containing drinks such as Blake-Aid/sweet tea do you drink in a day? " 0   How many cans/bottles of sugar pop/soda/tea/sports drinks do you drink in a day? 0   How many cans/bottles of diet pop/soda/tea or sports drink do you drink in a day? 0   How often do you have a drink of alcohol? 2-4 Times a Month   If you do drink, how many drinks might you have in a day? 3-4       MEDICATIONS:   Current Outpatient Medications   Medication     metFORMIN (GLUCOPHAGE-XR) 500 MG 24 hr tablet     phentermine (ADIPEX-P) 37.5 MG tablet     Semaglutide, 1 MG/DOSE, 4 MG/3ML SOPN     No current facility-administered medications for this visit.       Weight Loss Medication History Reviewed With Patient 8/19/2021   Which weight loss medications are you currently taking on a regular basis?  None   If you are not taking a weight loss medication that was prescribed to you, please indicate why: It did not seem to be helping me, My insurance does not cover the cost   Are you having any side effects from the weight loss medication that we have prescribed you? -       LABS:  No results found for: A1C  No results found for: CHOL  No results found for: TSH  No results found for: CR  No results found for: ALT    ASSESSMENT:  Ms. Gao is a 36 year old female with history of Class 2 obesity with current body mass index is 39.11 kg/m . and with current health consequences who presents for weight management follow-up consultation.  The following diagnoses are relevant to Matt Gao's history of obesity:     PLAN:    Problem   Class 2 Obesity    July 2021: My first time meeting Ms Gao, previously she had seen Dr. Alonso. She is trying to get pregnant. We will start metformin and semaglutide.     Aug 2021: Was only able to start the metformin. Takes 3 pills at once. Discussed taking metformin twice daily instead. Will start phentermine. Discussed health coaching. She will look into it through her job.         No problem-specific Assessment & Plan notes found for this encounter.      No orders of the  "defined types were placed in this encounter.       With motivational interviewing, Matt took part in making the following plan:  Patient Instructions   Call your insurance and see what they cover for weight loss medications    Ask if \"Wegovy\" is covered now that semaglutide is FDA approved for weight loss.  - If not, ask them if Saxenda is covered for weight loss.       FOLLOW-UP:    4 weeks.    20 minutes spent on the date of the encounter doing chart review, history and exam, documentation and further activities as noted above.    Thank you for including me in the care of your patient.  Please do not hesitate to call with questions or concerns.    Sincerely,    Tracey Dean MD MPH  Diplomate, American Board of Obesity Medicine, American Board of Internal Medicine, American Board of Pediatrics    Departments of Internal Medicine and Pediatrics  [facility]        [unfilled]       VINCE is a 36 year old who is being evaluated via a billable video visit.      How would you like to obtain your AVS? MyChart  If the video visit is dropped, the invitation should be resent by: Text to cell phone: [phone]  Will anyone else be joining your video visit? No       During this virtual visit the patient is located in [place], patient verifies this as the location during the entirety of this visit.       Video Start Time: 1:10  Video-Visit Details    Type of service:  Video Visit    Video End Time:1:27 PM    Originating Location (pt. Location): Home    Distant Location (provider location):  [facility] WEIGHT MANAGEMENT CLINIC [place]     Platform used for Video Visit: [employer]        Again, thank you for allowing me to participate in the care of your patient.      Sincerely,    Tracey Dean MD      "

## 2021-08-20 ENCOUNTER — TELEPHONE (OUTPATIENT)
Dept: ENDOCRINOLOGY | Facility: CLINIC | Age: 36
End: 2021-08-20

## 2021-08-20 DIAGNOSIS — E66.811 OBESITY (BMI 30.0-34.9): ICD-10-CM

## 2021-08-20 RX ORDER — PHENTERMINE HYDROCHLORIDE 37.5 MG/1
TABLET ORAL
Qty: 45 TABLET | OUTPATIENT
Start: 2021-08-20

## 2021-08-20 NOTE — TELEPHONE ENCOUNTER
PRIOR AUTHORIZATION DENIED - COMPLETED VIA EPA     Medication: phentermine (ADIPEX-P) 37.5 MG tablet - DENIED     Denial Date:  08/20/2021    Denial Rational: Continuation of therapy is approved when you must have lost greater than or equal to 5% of baseline body weight    Your BMI is greater than when you first started on the medication.    Your BMI on 10/04/2019 was 31.72     Your BMI on 08/19/2021 was 39.11    Appeal Information: Will document once denial letter is received from the insurance     1st attempt 08/23/2021 - called insurance; waiting for them to fax us the denial letter     2nd attempt 08/25/2021 - called insurance; waiting for them to fax us the denial letter

## 2021-08-20 NOTE — TELEPHONE ENCOUNTER
phentermine (ADIPEX-P) 37.5 MG tablet Take 0.5 tablets (18.75 mg) by mouth every morning (before breakfast)     Last Written Prescription Date:  8/19/21  Last Fill Quantity: 45,   # refills: 1  Last Office Visit : 8/19/21  Future Office visit:  12/2/21    Duplicate

## 2021-08-27 NOTE — TELEPHONE ENCOUNTER
2 attempts to get the denial letter faxed to us was unsuccessful      The denial information was sent directly to the clinic     Will route the information that we have to the Care Team.

## 2021-09-01 ENCOUNTER — TELEPHONE (OUTPATIENT)
Dept: ENDOCRINOLOGY | Facility: CLINIC | Age: 36
End: 2021-09-01

## 2021-09-01 NOTE — TELEPHONE ENCOUNTER
No show for today's video visit. Sent text with link to join. Called pt, no answer. Sent Hashable message, no response. Closed video after 15 mins. Provided pt with number to reschedule.     Misty Hicks RD, LD

## 2021-10-10 ENCOUNTER — HEALTH MAINTENANCE LETTER (OUTPATIENT)
Age: 36
End: 2021-10-10

## 2022-02-03 ENCOUNTER — VIRTUAL VISIT (OUTPATIENT)
Dept: ENDOCRINOLOGY | Facility: CLINIC | Age: 37
End: 2022-02-03
Payer: COMMERCIAL

## 2022-02-03 VITALS — HEIGHT: 65 IN | WEIGHT: 220 LBS | BODY MASS INDEX: 36.65 KG/M2

## 2022-02-03 DIAGNOSIS — Z53.9 ERRONEOUS ENCOUNTER--DISREGARD: Primary | ICD-10-CM

## 2022-02-03 PROCEDURE — 99207 PR NO BILLABLE SERVICE THIS VISIT: CPT | Performed by: INTERNAL MEDICINE

## 2022-02-03 ASSESSMENT — PAIN SCALES - GENERAL: PAINLEVEL: NO PAIN (0)

## 2022-02-03 ASSESSMENT — MIFFLIN-ST. JEOR: SCORE: 1683.79

## 2022-02-03 NOTE — LETTER
2/3/2022       RE: Matt Gao  [address]  [facility] [address]     Dear Colleague,    Thank you for referring your patient, Matt Gao, to the [facility] WEIGHT MANAGEMENT CLINIC [facility]. Please see a copy of my visit note below.    Rescheduled        Again, thank you for allowing me to participate in the care of your patient.      Sincerely,    Tracey Dean MD

## 2022-02-03 NOTE — LETTER
Date:March 6, 2022      Patient was self referred, no letter generated. Do not send.        [facility] Health Information

## 2022-02-03 NOTE — NURSING NOTE
"(   Chief Complaint   Patient presents with     RECHECK     Return MWM    )    ( Weight: 99.8 kg (220 lb) (Patient reported) )  ( Height: 165.1 cm (5' 5\") )  ( BMI (Calculated): 36.61 )  (   )  (   )  (   )  (   )  (   )  (   )    (   )  (   )  (   )  (   )  (   )  (   )  (   )    (   Patient Active Problem List   Diagnosis     Class 2 obesity    )  (   Current Outpatient Medications   Medication Sig Dispense Refill     phentermine (ADIPEX-P) 37.5 MG tablet Take 0.5 tablets (18.75 mg) by mouth every morning (before breakfast) 45 tablet 1     metFORMIN (GLUCOPHAGE-XR) 500 MG 24 hr tablet Week 1: take 1 tablet by mouth daily; Week 2: take 2 by mouth daily; Week 3 and beyond take 3 tabs by mouth daily (Patient not taking: Reported on 2/3/2022) 90 tablet 3     Semaglutide, 1 MG/DOSE, 4 MG/3ML SOPN Inject 0.25 mg Subcutaneous once a week After 1 month, increase to 0.5mg weekly. (Patient not taking: Reported on 8/19/2021) 3 mL 1    )  ( Diabetes Eval:    )    ( Pain Eval:  No Pain (0) )    ( Wound Eval:       )    (   History   Smoking Status     Never Smoker   Smokeless Tobacco     Never Used    )    ( Signed By:  Deirdre Mario, EMT; February 3, 2022; 4:21 PM )    "

## 2022-03-24 DIAGNOSIS — E66.811 OBESITY (BMI 30.0-34.9): ICD-10-CM

## 2022-03-24 RX ORDER — PHENTERMINE HYDROCHLORIDE 37.5 MG/1
0.5 TABLET ORAL
Qty: 15 TABLET | Refills: 0 | Status: SHIPPED | OUTPATIENT
Start: 2022-03-24 | End: 2022-04-08

## 2022-03-24 NOTE — TELEPHONE ENCOUNTER
Last Clinic Visit: 8/19/2021  [facility] [place]  Recommended 4 week follow up  No upcoming appointments  Call to [place], advised of need to schedule appointment prior to provider reviewing refill request.  Agrees, warm transfer to call center.       phentermine (ADIPEX-P) 37.5 MG tablet      Last Written Prescription Date:  8/19/21  Last Fill Quantity: 45,   # refills: 1  Last Office Visit : 8/19/21 recommended 4 week follow up  Future Office visit:  4/8/22 with Dr Alonso    Routing refill request to provider for review/approval because:  Drug controlled substance

## 2022-03-24 NOTE — TELEPHONE ENCOUNTER
Pt called and needs to get a refill on Phentermine.    Please [facility] to [facility] [id number] [place] [facility] [address] [facility] [address] [facility]    Please call pt at [phone] with any questions or updates.

## 2022-04-08 ENCOUNTER — VIRTUAL VISIT (OUTPATIENT)
Dept: ENDOCRINOLOGY | Facility: CLINIC | Age: 37
End: 2022-04-08
Payer: COMMERCIAL

## 2022-04-08 VITALS — WEIGHT: 220 LBS | HEIGHT: 65 IN | BODY MASS INDEX: 36.65 KG/M2

## 2022-04-08 DIAGNOSIS — E66.01 CLASS 2 SEVERE OBESITY DUE TO EXCESS CALORIES WITH SERIOUS COMORBIDITY AND BODY MASS INDEX (BMI) OF 36.0 TO 36.9 IN ADULT (H): Primary | ICD-10-CM

## 2022-04-08 DIAGNOSIS — E66.812 CLASS 2 SEVERE OBESITY DUE TO EXCESS CALORIES WITH SERIOUS COMORBIDITY AND BODY MASS INDEX (BMI) OF 36.0 TO 36.9 IN ADULT (H): Primary | ICD-10-CM

## 2022-04-08 PROCEDURE — 99214 OFFICE O/P EST MOD 30 MIN: CPT | Mod: GT | Performed by: INTERNAL MEDICINE

## 2022-04-08 RX ORDER — PHENTERMINE HYDROCHLORIDE 37.5 MG/1
0.5 TABLET ORAL
Qty: 30 TABLET | Refills: 2 | Status: SHIPPED | OUTPATIENT
Start: 2022-04-08 | End: 2022-06-17

## 2022-04-08 ASSESSMENT — PAIN SCALES - GENERAL: PAINLEVEL: NO PAIN (0)

## 2022-04-08 NOTE — LETTER
Date:April 11, 2022      Patient was self referred, no letter generated. Do not send.        [facility] Health Information

## 2022-04-08 NOTE — LETTER
"2022       RE: Matt Gao  [address]  [place] [address]     Dear Colleague,    Thank you for referring your patient, Matt Gao, to the [facility] [place] at [facility]. Please see a copy of my visit note below.    VINCE is a 36 year old who is being evaluated via a billable video visit.      How would you like to obtain your AVS? MyChart  If the video visit is dropped, the invitation should be resent by: Text to cell phone: [phone]  Will anyone else be joining your video visit? No    Video Start Time: 11:32  Video-Visit Details    Type of service:  Video Visit    Video End Time:11:43    Originating Location (pt. Location): Home    Distant Location (provider location):  [facility] [place]     Platform used for Video Visit: [facility] Note     Matt Gao  MRN:  [id number]  :  1985  FADI:  2022    Dear Physician No Ref-Primary,    I had the pleasure of seeing your patient Matt Gao. She is a 36 year old female who I am continuing to see for treatment of obesity related to:       4/10/2019   I have the following health issues associated with obesity: High Cholesterol   I have the following symptoms associated with obesity: Knee Pain       INTERVAL HISTORY:    Last visit here was with Dr. Dean  On  and of note is the following--  \"  Class 2 Obesity     2021: My first time meeting Ms Gao, previously she had seen Dr. Alonso. She is trying to get pregnant. We will start metformin and semaglutide.      Aug 2021: Was only able to start the metformin. Takes 3 pills at once. Discussed taking metformin twice daily instead. Will start phentermine. Discussed health coaching. She will look into it through her job.    \"    Since then she notes the following--  -still trying to get " "pregnant--not taking the semaglutide (insurance did not cover this) and not on the metformin (had GI issues). We reviewed that we could restart metformin but more gradually advance if/as tolerated but this is not as likely to help with wt loss as phentermine or semaglutide. Metformin could help with fertility if PCOS is an underlying issue. She would like to see about restart of Wegovy and is currently taking 1/2 of a phentermine tablet daily without any issues, and noting some appetite suppression with this.    She is working on healthy eating/activity exercise again also and she is working on this but would like to renew pharm support.    CURRENT WEIGHT:   220 lbs 0 oz   Body mass index is 36.61 kg/m .        Wt Readings from Last 3 Encounters:   04/08/22 99.8 kg (220 lb)   02/03/22 99.8 kg (220 lb)   08/19/21 106.6 kg (235 lb)                Changes and Difficulties 8/19/2021   I have made the following changes to my diet since my last visit: Cutting my portions   With regards to my diet, I am still struggling with: Cravings   I have made the following changes to my activity/exercise since my last visit: -   With regards to my activity/exercise, I am still struggling with: -       VITALS:  Ht 1.651 m (5' 5\")   Wt 99.8 kg (220 lb)   BMI 36.61 kg/m      MEDICATIONS:   Current Outpatient Medications   Medication Sig Dispense Refill     phentermine (ADIPEX-P) 37.5 MG tablet Take 0.5 tablets (18.75 mg) by mouth every morning (before breakfast) 15 tablet 0     metFORMIN (GLUCOPHAGE-XR) 500 MG 24 hr tablet Week 1: take 1 tablet by mouth daily; Week 2: take 2 by mouth daily; Week 3 and beyond take 3 tabs by mouth daily (Patient not taking: Reported on 2/3/2022) 90 tablet 3     Semaglutide, 1 MG/DOSE, 4 MG/3ML SOPN Inject 0.25 mg Subcutaneous once a week After 1 month, increase to 0.5mg weekly. (Patient not taking: Reported on 8/19/2021) 3 mL 1       Weight Loss Medication History Reviewed With Patient 1/31/2022   Which " weight loss medications are you currently taking on a regular basis?  Phentermine   If you are not taking a weight loss medication that was prescribed to you, please indicate why: My insurance does not cover the cost   Are you having any side effects from the weight loss medication that we have prescribed you? Yes   If you are having side effects please describe: Not sleeping well       ASSESSMENT/PLAN:   Class 2 obesity with comorbidities    Plans--  --continue phentermine 1/2 adipex  --try again with getting Wegovy started--pt will let us know if any problems with starting that med/if too expensive  --RTC Lauren Bloch in 1 mo and me in 2 mo        Time: approx 31 min spent on evaluation, management, counseling, education, & motivational interviewing (video) combined with previsit prep and post visit follow up care/charting same day.    Sincerely,    Tex Alonso MD          Again, thank you for allowing me to participate in the care of your patient.      Sincerely,    Tex Alonso MD

## 2022-04-08 NOTE — PROGRESS NOTES
"VINCE is a 36 year old who is being evaluated via a billable video visit.      How would you like to obtain your AVS? MyChart  If the video visit is dropped, the invitation should be resent by: Text to cell phone: [phone]  Will anyone else be joining your video visit? No    Video Start Time: 11:32  Video-Visit Details    Type of service:  Video Visit    Video End Time:11:43    Originating Location (pt. Location): Home    Distant Location (provider location):  [facility] [place]     Platform used for Video Visit: [employer]etAnywhere         [facility] Medical Weight Management Note     Matt Gao  MRN:  [id number]  :  1985  FADI:  2022    Dear Physician No Ref-Primary,    I had the pleasure of seeing your patient Matt Gao. She is a 36 year old female who I am continuing to see for treatment of obesity related to:       4/10/2019   I have the following health issues associated with obesity: High Cholesterol   I have the following symptoms associated with obesity: Knee Pain       INTERVAL HISTORY:    Last visit here was with Dr. Dean  On  and of note is the following--  \"  Class 2 Obesity     2021: My first time meeting Ms Gao, previously she had seen Dr. Alonso. She is trying to get pregnant. We will start metformin and semaglutide.      Aug 2021: Was only able to start the metformin. Takes 3 pills at once. Discussed taking metformin twice daily instead. Will start phentermine. Discussed health coaching. She will look into it through her job.    \"    Since then she notes the following--  -still trying to get pregnant--not taking the semaglutide (insurance did not cover this) and not on the metformin (had GI issues). We reviewed that we could restart metformin but more gradually advance if/as tolerated but this is not as likely to help with wt loss as phentermine or semaglutide. Metformin could help with fertility if PCOS is an underlying issue. " "She would like to see about restart of Wegovy and is currently taking 1/2 of a phentermine tablet daily without any issues, and noting some appetite suppression with this.    She is working on healthy eating/activity exercise again also and she is working on this but would like to renew pharm support.    CURRENT WEIGHT:   220 lbs 0 oz   Body mass index is 36.61 kg/m .        Wt Readings from Last 3 Encounters:   04/08/22 99.8 kg (220 lb)   02/03/22 99.8 kg (220 lb)   08/19/21 106.6 kg (235 lb)                Changes and Difficulties 8/19/2021   I have made the following changes to my diet since my last visit: Cutting my portions   With regards to my diet, I am still struggling with: Cravings   I have made the following changes to my activity/exercise since my last visit: -   With regards to my activity/exercise, I am still struggling with: -       VITALS:  Ht 1.651 m (5' 5\")   Wt 99.8 kg (220 lb)   BMI 36.61 kg/m      MEDICATIONS:   Current Outpatient Medications   Medication Sig Dispense Refill     phentermine (ADIPEX-P) 37.5 MG tablet Take 0.5 tablets (18.75 mg) by mouth every morning (before breakfast) 15 tablet 0     metFORMIN (GLUCOPHAGE-XR) 500 MG 24 hr tablet Week 1: take 1 tablet by mouth daily; Week 2: take 2 by mouth daily; Week 3 and beyond take 3 tabs by mouth daily (Patient not taking: Reported on 2/3/2022) 90 tablet 3     Semaglutide, 1 MG/DOSE, 4 MG/3ML SOPN Inject 0.25 mg Subcutaneous once a week After 1 month, increase to 0.5mg weekly. (Patient not taking: Reported on 8/19/2021) 3 mL 1       Weight Loss Medication History Reviewed With Patient 1/31/2022   Which weight loss medications are you currently taking on a regular basis?  Phentermine   If you are not taking a weight loss medication that was prescribed to you, please indicate why: My insurance does not cover the cost   Are you having any side effects from the weight loss medication that we have prescribed you? Yes   If you are having side " effects please describe: Not sleeping well       ASSESSMENT/PLAN:   Class 2 obesity with comorbidities    Plans--  --continue phentermine 1/2 adipex  --try again with getting Wegovy started--pt will let us know if any problems with starting that med/if too expensive  --RTC Lauren Bloch in 1 mo and me in 2 mo        Time: approx 31 min spent on evaluation, management, counseling, education, & motivational interviewing (video) combined with previsit prep and post visit follow up care/charting same day.    Sincerely,    Tex Alonso MD

## 2022-04-09 NOTE — PATIENT INSTRUCTIONS
Thank you for allowing us the privilege of caring for you. We hope we provided you with the excellent service you deserve.    Please let us know if there is anything else we can do for you so that we can be sure you are completely satisfied with your care experience.    Your visit was with Dr. Alonso today.    Instructions per today's visit:  --for now we will continue phentermine (1/2 tablet each morning)  --we will also see if the Wegovy can be adequately covered by insurance  --please call us if you have any problems getting started on the medication or after taking it  --we are planning follow up appointments with Lauren Bloch in 1 mo and Dr. Alonso in 2 mo.    Please call our contact center at [phone] to schedule your next appointments.    Meal Replacement Products:    Here is the link to our new e-store where you can purchase our meal replacement products    [email or web address]    The one week starter kit is a great way to sample a variety of products and see what works for you.    If you want more information about the product go to: Aceable    Free Shipping for orders over $75    Benefits of meal replacements products:    Portion and calorie control  Improved nutrition  Structured eating  Simplified food choices  Avoid contact with trigger foods    Interested in working with a health ?  Health coaches work with you to improve your overall health and wellbeing.  They look at the whole person, and may involve discussion of different areas of life, including, but not limited to the four pillars of health (sleep, exercise, nutrition, and stress management). Discuss with your care team if you would like to start working a health .    Health Coaching-3 Pack: Schedule by calling [phone]    $99 for three health coaching visits    Visits may be done in person or via phone    Coaching is a partnership between the  and the client;  Coaches do not prescribe or diagnose    Coaching helps inspire the client to reach his/her personal goals    Bluetooth Scale:    We hope to provide you with high quality telephone and virtual healthcare visits while social distancing for COVID-19 is necessary, as well as in the future when virtual visits may be more convenient for you.    Our technology team made it possible for Bluetooth scales to send weight measurements to our electronic medical record. This allows weights from you weighing at home to securely flow into the medical record, which will improve telephone and virtual visits.  Additionally, studies have shown that adults actually lose more weight when their weights are automatically sent to someone else, and also that this process is not stressful for those adults.    Below is a link for purchasing the scale, with a discount code for our patients. You may call your insurance company to see if they will reimburse you for the cost of the scale, as a piece of durable medical equipment. The scales only go up to a weight of 400 pounds. This is an issue and we are working with the developer on increasing this. We found no scales that go over 400lb that have blue-tooth for connecting to Ac[employer].    Scale to purchase: the Yellow Pages  Body  Scale: [email or web address]=aw.ds    Discount Code: We have a discount code for our patients to bring the cost down to $50, the code is:  withmed     Steps to link the scale to Accelereach via an Android Phone (you can always disconnect at any point in the future):  1. The order must be placed first before the patient can access Track My Health within Accelereach.  2. Download Google Fit eric from the Google Play Store  a. Log in or register using your Google account  3. Download the Accelereach eric from Google Play Store  a. Select add organization  b. Search for GeoOP and select it  c. Log into  Victorious  d. Select Track Publer Health  e. Select the green connect my account button  f. When prompted log into your Google account  g. Select okay to confirm the account  4. Download the Withings Health Mate frank from Google Play Store  a. Notrees for Withings  b. Go to profile  c. Tap google fit under the Apps section  d. Select the option to activate Google Fit integration  e. Select the same Google account  f. Select okay to confirm the account  g.  Steps to link the scale to Victorious via an iPhone (you can always disconnect at any point in the future):  **Note Havkraft is not available for download on an iPad**  1. The order must be placed first before the patient can access [employer] within Victorious.  2. Locate the Health frank on your iPhone.  a. Set up your Apple Health account as prompted  b. The Sources page will show Apps that communicate with your Health frank. Once all steps are completed, you should see via680 and C2C REI Softwarehart listed under the Apps section and your iPhone under the devices section.  i. Select Health Xobni  1. Under 'ALLOW  Lovestruck.com  TO WRITE DATA ensure the toggle is on for Weight.  2. This will allow the scale to add your weight to the Apple Health  ii. Select C2C REI Softwarehart  1. Under 'ALLOW  HealthSpring  TO READ DATA ensure the toggle is on for Weight.  2. This allows Victorious to grab the weight from Havkraft so your provider can see your weights.  3. Download the Chuguobangt frank from the Frank Store  a. Select add organization                                                  b. Search for Always Prepped and select it  c. Log into Victorious  d. Select Track Publer Health  e. Select the green connect my account button  f. Follow prompts to link your device to Victorious.  4. Download the Withings health mate frank in the Frank Store  a. Notrees for Withings  b. Go to profile  c. Tap Health under the Apps section  d. If prompted to allow access with the Health Frank, toggle weight on for read and write  access.      For any questions/concerns contact Helena Beckwith LPN at [phone]    To schedule appointments with our team, please call [phone]    Please call during clinic hours Monday through Friday 8:00a - 4:00p if you have questions or you can contact us via GeoLearning at anytime.      Lab results will be communicated through My Chart or letter (if My Chart not used). Please call the clinic if you have not received communication after 1 week or if you have any questions.    Clinic Fax: [phone]    Thank you,  Medical Weight Management Team

## 2022-04-11 ENCOUNTER — TELEPHONE (OUTPATIENT)
Dept: ENDOCRINOLOGY | Facility: CLINIC | Age: 37
End: 2022-04-11
Payer: COMMERCIAL

## 2022-04-13 ENCOUNTER — TELEPHONE (OUTPATIENT)
Dept: ENDOCRINOLOGY | Facility: CLINIC | Age: 37
End: 2022-04-13
Payer: COMMERCIAL

## 2022-04-13 DIAGNOSIS — E66.01 CLASS 2 SEVERE OBESITY DUE TO EXCESS CALORIES WITH SERIOUS COMORBIDITY AND BODY MASS INDEX (BMI) OF 36.0 TO 36.9 IN ADULT (H): Primary | ICD-10-CM

## 2022-04-13 DIAGNOSIS — E66.812 CLASS 2 SEVERE OBESITY DUE TO EXCESS CALORIES WITH SERIOUS COMORBIDITY AND BODY MASS INDEX (BMI) OF 36.0 TO 36.9 IN ADULT (H): Primary | ICD-10-CM

## 2022-04-13 RX ORDER — SEMAGLUTIDE 0.5 MG/.5ML
0.5 INJECTION, SOLUTION SUBCUTANEOUS WEEKLY
Qty: 2 ML | Refills: 0 | Status: SHIPPED | OUTPATIENT
Start: 2022-04-13 | End: 2022-06-17 | Stop reason: ALTCHOICE

## 2022-04-13 RX ORDER — SEMAGLUTIDE 0.25 MG/.5ML
0.25 INJECTION, SOLUTION SUBCUTANEOUS WEEKLY
Qty: 2 ML | Refills: 0 | Status: SHIPPED | OUTPATIENT
Start: 2022-04-13 | End: 2022-05-13

## 2022-04-13 NOTE — TELEPHONE ENCOUNTER
Central Prior Authorization Team   Phone: [phone]      PRIOR AUTHORIZATION DENIED    Medication: Semaglutide, 1 MG/DOSE, 4 MG/3ML SOPN - EPA DENIED     Denial Date: 4/13/2022    Denial Rational: Coverage maybe provided when the patient has diagnosis of diabetes mellitus type 2        Appeal Information: If the provider would like to appeal this denial, please provide a letter of medical necessity. Please also include any therapies that the patient has tried and their outcomes. The patient's insurance company will also require the provider to address why the insurance preferred options are not appropriate in the patient's therapy.  The reason could be that the preferred options will harm the patient; either physically or mentally. They are contraindicated to the patient; or the patient has already been taking the requested medication and changing the therapy would change the outcome of their therapy.    Once it has been completed and placed in the patient's chart, notify me directly, VERONICA CORNEJO and the appeal can be initiated on behalf of the patient and provider.

## 2022-04-13 NOTE — TELEPHONE ENCOUNTER
Dr. Alonso's note from office visit 4/8/22 states:    Plans--  --continue phentermine 1/2 adipex  --try again with getting Wegovy started--pt will let us know if any problems with starting that med/if too expensive  --RTC Lauren Bloch in 1 mo and me in 2 mo    Ozempic (semaglutide) was sent to pharmacy and PA denied due to patient not having T2DM. Sending Wegovy.

## 2022-05-13 ENCOUNTER — VIRTUAL VISIT (OUTPATIENT)
Dept: ENDOCRINOLOGY | Facility: CLINIC | Age: 37
End: 2022-05-13
Payer: COMMERCIAL

## 2022-05-13 VITALS — WEIGHT: 217.2 LBS | BODY MASS INDEX: 36.19 KG/M2 | HEIGHT: 65 IN

## 2022-05-13 DIAGNOSIS — E66.01 CLASS 2 SEVERE OBESITY DUE TO EXCESS CALORIES WITH SERIOUS COMORBIDITY AND BODY MASS INDEX (BMI) OF 36.0 TO 36.9 IN ADULT (H): Primary | ICD-10-CM

## 2022-05-13 DIAGNOSIS — E66.812 CLASS 2 SEVERE OBESITY DUE TO EXCESS CALORIES WITH SERIOUS COMORBIDITY AND BODY MASS INDEX (BMI) OF 36.0 TO 36.9 IN ADULT (H): Primary | ICD-10-CM

## 2022-05-13 PROCEDURE — 99215 OFFICE O/P EST HI 40 MIN: CPT | Mod: GT | Performed by: INTERNAL MEDICINE

## 2022-05-13 RX ORDER — SEMAGLUTIDE 0.25 MG/.5ML
0.25 INJECTION, SOLUTION SUBCUTANEOUS WEEKLY
Qty: 2 ML | Refills: 2 | Status: SHIPPED | OUTPATIENT
Start: 2022-05-13 | End: 2022-05-13

## 2022-05-13 RX ORDER — SEMAGLUTIDE 0.25 MG/.5ML
0.25 INJECTION, SOLUTION SUBCUTANEOUS WEEKLY
Qty: 2 ML | Refills: 3 | Status: SHIPPED | OUTPATIENT
Start: 2022-05-13 | End: 2023-02-03

## 2022-05-13 ASSESSMENT — PAIN SCALES - GENERAL: PAINLEVEL: NO PAIN (0)

## 2022-05-13 NOTE — PATIENT INSTRUCTIONS
Thank you for allowing us the privilege of caring for you. We hope we provided you with the excellent service you deserve.    Please let us know if there is anything else we can do for you so that we can be sure you are completely satisfied with your care experience.    Your visit was with Dr. Alonso today.    Instructions per today's visit:  --we can plan return visit in about 2-3 mo with Dr. Alonso  --refills ordered on the 0.25mg Wegovy pens in case there is a lapse in availability of the 0.5mg pens and you can stay on 0.25mg/wk until the 0.5mg pens come online again  --continue taking phentermine 1/2 tablet; please check your blood pressure and pulse 1-2 times per week and let us know if pulse >100 and/or blood pressure > 140/90  Please call our contact center at [phone] to schedule your next appointments.    Meal Replacement Products:    Here is the link to our new e-store where you can purchase our meal replacement products    [employer]/store    The one week starter kit is a great way to sample a variety of products and see what works for you.    If you want more information about the product go to: [facility]    Free Shipping for orders over $75    Benefits of meal replacements products:    Portion and calorie control  Improved nutrition  Structured eating  Simplified food choices  Avoid contact with trigger foods    Interested in working with a health ?  Health coaches work with you to improve your overall health and wellbeing.  They look at the whole person, and may involve discussion of different areas of life, including, but not limited to the four pillars of health (sleep, exercise, nutrition, and stress management). Discuss with your care team if you would like to start working a health .    Health Coaching-3 Pack: Schedule by calling [phone]    $99 for three health coaching visits    Visits may be done in person or via  phone    Coaching is a partnership between the  and the client; Coaches do not prescribe or diagnose    Coaching helps inspire the client to reach his/her personal goals    Bluetooth Scale:    We hope to provide you with high quality telephone and virtual healthcare visits while social distancing for COVID-19 is necessary, as well as in the future when virtual visits may be more convenient for you.    Our technology team made it possible for Bluetooth scales to send weight measurements to our electronic medical record. This allows weights from you weighing at home to securely flow into the medical record, which will improve telephone and virtual visits.  Additionally, studies have shown that adults actually lose more weight when their weights are automatically sent to someone else, and also that this process is not stressful for those adults.    Below is a link for purchasing the scale, with a discount code for our patients. You may call your insurance company to see if they will reimburse you for the cost of the scale, as a piece of durable medical equipment. The scales only go up to a weight of 400 pounds. This is an issue and we are working with the developer on increasing this. We found no scales that go over 400lb that have blue-tooth for connecting to Highlighter.    Scale to purchase: the Wireless Environment  Body  Scale: [email or web address].Sociogramics/us/en/body/shop?[email or web address]=aw.ds    Discount Code: We have a discount code for our patients to bring the cost down to $50, the code is:  withmed     Steps to link the scale to Highlighter via an Android Phone (you can always disconnect at any point in the future):  1. The order must be placed first before the patient can access Track My Health within Highlighter.  2. Download Google Fit eric from the Google Play Store  a. Log in or register using your Google account  3. Download the Highlighter erci from Google Play Store  a. Select  add organization  b. Search for [employer] and select it  c. Log into Savosolar  d. Select Track [facility]  e. Select the green connect my account button  f. When prompted log into your Google account  g. Select okay to confirm the account  4. Download the InboxFever Health Mate frank from Google Play Store  a. [place] for [employer]  b. Go to profile  c. Tap google fit under the Apps section  d. Select the option to activate Google Fit integration  e. Select the same Google account  f. Select okay to confirm the account  g.  Steps to link the scale to Savosolar via an iPhone (you can always disconnect at any point in the future):  **Note HammerKit is not available for download on an iPad**  1. The order must be placed first before the patient can access Podclass within Savosolar.  2. Locate the Health frank on your iPhone.  a. Set up your Apple Health account as prompted  b. The Sources page will show Apps that communicate with your Health frank. Once all steps are completed, you should see Invincea and [employer] listed under the Apps section and your iPhone under the devices section.  i. Select Health Mate  1. Under 'ALLOW  HEALTH Virtual Command  TO WRITE DATA ensure the toggle is on for Weight.  2. This will allow the scale to add your weight to the Apple Health  ii. Select [employer] Communications[employer]  1. Under 'ALLOW  DockerT  TO READ DATA ensure the toggle is on for Weight.  2. This allows Savosolar to grab the weight from HammerKit so your provider can see your weights.  3. Download the LemonCratet frank from the Frank Store  a. Select add organization                                                  b. Search for [employer] and select it  c. Log into Savosolar  d. Select Track [facility] Health  e. Select the green connect my account button  f. Follow prompts to link your device to Savosolar.  4. Download the InboxFever health mate frank in the Frank Store  a. [place] for Withings  b. Go to profile  c. Tap Health under the Apps section  d. If prompted to allow access  with the Health Frank, toggle weight on for read and write access.      For any questions/concerns contact Helena Beckwith LPN at [phone]    To schedule appointments with our team, please call [phone]    Please call during clinic hours Monday through Friday 8:00a - 4:00p if you have questions or you can contact us via [employer] at anytime.      Lab results will be communicated through My Chart or letter (if My Chart not used). Please call the clinic if you have not received communication after 1 week or if you have any questions.    Clinic Fax: [phone]    Thank you,  Medical Weight Management Team

## 2022-05-13 NOTE — LETTER
Date:May 16, 2022      Patient was self referred, no letter generated. Do not send.        [facility] Health Information

## 2022-05-13 NOTE — PROGRESS NOTES
"VINCE is a 36 year old who is being evaluated via a billable video visit.      How would you like to obtain your AVS? MyChart  If the video visit is dropped, the invitation should be resent by: [phone]  Will anyone else be joining your video visit? No    During this virtual visit the patient is located in [place], patient verifies this as the location during the entirety of this visit.   Video Start Time: 8:54  Video-Visit Details    Type of service:  Video Visit    Video End Time:9:16    Originating Location (pt. Location): Home    Distant Location (provider location):  [facility] [place]     Platform used for Video Visit: BuildForge         [facility] Weight Management Note     Matt Gao  MRN:  [id number]  :  1985  FADI:  2022    Dear Physician No Ref-Primary,    I had the pleasure of seeing your patient Matt Gao. She is a 36 year old female who I am continuing to see for treatment of obesity related to:       4/10/2019   I have the following health issues associated with obesity: High Cholesterol   I have the following symptoms associated with obesity: Knee Pain       INTERVAL HISTORY:      --she was last seen about a month ago. Reviewed and relevant history with regards to earlier treatment is as follows, as extracted from earlier note--  \"  Class 2 Obesity     2021: My first time meeting Ms Gao, previously she had seen Dr. Alonso. She is trying to get pregnant. We will start metformin and semaglutide.      Aug 2021: Was only able to start the metformin. Takes 3 pills at once. Discussed taking metformin twice daily instead. Will start phentermine. Discussed health coaching. She will look into it through her job.    \"     Since then she notes the following--  --not on the metformin (had GI issues). We have reviewed that we could restart metformin but more gradually advance if/as tolerated but this is not as likely to help with wt loss " "as phentermine or semaglutide. Metformin could help with fertility if PCOS is an underlying issue.  --Currently taking 1/2 of a phentermine tablet daily without any issues, and noting some appetite suppression with this.     --She is working on healthy eating/activity exercise again also  --food plans: working on smaller portions, more nonstarchy veggies and less starches/sugars, more protein     --Since starting the semaglutide she had some nausea and a little fatigue but better now. Noticing some appetite suppression now some with current med combination. Encouraged to cut portions/stop when full to help prevent med side effects    --pregnancy work up--had series of tests done, not yet pursuing IVF but working on wt loss to help improve chance of pregnancy      LAST WEIGHT:   220 lbs 0 oz   Body mass index is 36.61 kg/m .    CURRENT WEIGHT:   217 lbs 3.2 oz   Body mass index is 36.14 kg/m .    Initial Weight (lbs): 205 lbs  Last Visits Weight: 99.8 kg (220 lb)  Cumulative weight loss (lbs): -12.2  Weight Loss Percentage: -5.95%    Changes and Difficulties 5/13/2022   I have made the following changes to my diet since my last visit: Cut down on portions, added more vegetables   With regards to my diet, I am still struggling with: Nothing   I have made the following changes to my activity/exercise since my last visit: Exercising 3x per week   With regards to my activity/exercise, I am still struggling with: Difficult to plan with variable work schedule       VITALS:  Ht 1.651 m (5' 5\")   Wt 98.5 kg (217 lb 3.2 oz)   BMI 36.14 kg/m      MEDICATIONS:   Current Outpatient Medications   Medication Sig Dispense Refill     phentermine (ADIPEX-P) 37.5 MG tablet Take 0.5 tablets (18.75 mg) by mouth every morning (before breakfast) 30 tablet 2     Semaglutide-Weight Management (WEGOVY) 0.25 MG/0.5ML SOAJ Inject 0.25 mg Subcutaneous once a week 2 mL 0     Semaglutide-Weight Management (WEGOVY) 0.5 MG/0.5ML SOAJ Inject 0.5 " mg Subcutaneous once a week After 4 week course of 0.25mg is complete. (Patient not taking: Reported on 5/13/2022) 2 mL 0       Weight Loss Medication History Reviewed With Patient 5/13/2022   Which weight loss medications are you currently taking on a regular basis?  Phentermine   If you are not taking a weight loss medication that was prescribed to you, please indicate why: -   Are you having any side effects from the weight loss medication that we have prescribed you? Yes   If you are having side effects please describe: More bloated and tired with Wegovy       ASSESSMENT:     Class 2 obesity with comorbidities     Plans--  --RTC in about 2-3 mo; will place more refills on the 0.25mg Wegovy pens in case there is a lapse in availability of the 0.5mg pens and she can stay on 0.25mg/wk until the 0.5mg pens come online again  --encouraged to check BP/P 1-2 times per week and let us know if P >100 and/or BP> 140/90 (taking phentermine @ 1/2 tablet regularly and without any side effects)             Time: approx 40 min spent on evaluation, management, counseling, education, & motivational interviewing (video) combined with previsit prep and post visit follow up care/charting same day.    Sincerely,    Tex Alonso MD

## 2022-05-13 NOTE — LETTER
"2022       RE: Matt Gao  [address]  [place] [address]     Dear Colleague,    Thank you for referring your patient, Matt Gao, to the [facility]NA[facility] [place] at [facility]. Please see a copy of my visit note below.    VINCE is a 36 year old who is being evaluated via a billable video visit.      How would you like to obtain your AVS? MyChart  If the video visit is dropped, the invitation should be resent by: [phone]  Will anyone else be joining your video visit? No    During this virtual visit the patient is located in [place], patient verifies this as the location during the entirety of this visit.   Video Start Time: 8:54  Video-Visit Details    Type of service:  Video Visit    Video End Time:9:16    Originating Location (pt. Location): Home    Distant Location (provider location):  [facility] [place]     Platform used for Video Visit: Self Point         [facility] Weight Management Note     Matt Gao  MRN:  [id number]  :  1985  FADI:  2022    Dear Physician No Ref-Primary,    I had the pleasure of seeing your patient Matt Gao. She is a 36 year old female who I am continuing to see for treatment of obesity related to:       4/10/2019   I have the following health issues associated with obesity: High Cholesterol   I have the following symptoms associated with obesity: Knee Pain       INTERVAL HISTORY:      --she was last seen about a month ago. Reviewed and relevant history with regards to earlier treatment is as follows, as extracted from earlier note--  \"      Class 2 Obesity     2021: My first time meeting Ms Gao, previously she had seen Dr. Alonso. She is trying to get pregnant. We will start metformin and semaglutide.      Aug 2021: Was only able to start the metformin. Takes 3 pills at once. Discussed taking " "metformin twice daily instead. Will start phentermine. Discussed health coaching. She will look into it through her job.    \"     Since then she notes the following--  --not on the metformin (had GI issues). We have reviewed that we could restart metformin but more gradually advance if/as tolerated but this is not as likely to help with wt loss as phentermine or semaglutide. Metformin could help with fertility if PCOS is an underlying issue.  --Currently taking 1/2 of a phentermine tablet daily without any issues, and noting some appetite suppression with this.     --She is working on healthy eating/activity exercise again also  --food plans: working on smaller portions, more nonstarchy veggies and less starches/sugars, more protein     --Since starting the semaglutide she had some nausea and a little fatigue but better now. Noticing some appetite suppression now some with current med combination. Encouraged to cut portions/stop when full to help prevent med side effects    --pregnancy work up--had series of tests done, not yet pursuing IVF but working on wt loss to help improve chance of pregnancy      LAST WEIGHT:   220 lbs 0 oz   Body mass index is 36.61 kg/m .    CURRENT WEIGHT:   217 lbs 3.2 oz   Body mass index is 36.14 kg/m .    Initial Weight (lbs): 205 lbs  Last Visits Weight: 99.8 kg (220 lb)  Cumulative weight loss (lbs): -12.2  Weight Loss Percentage: -5.95%    Changes and Difficulties 5/13/2022   I have made the following changes to my diet since my last visit: Cut down on portions, added more vegetables   With regards to my diet, I am still struggling with: Nothing   I have made the following changes to my activity/exercise since my last visit: Exercising 3x per week   With regards to my activity/exercise, I am still struggling with: Difficult to plan with variable work schedule       VITALS:  Ht 1.651 m (5' 5\")   Wt 98.5 kg (217 lb 3.2 oz)   BMI 36.14 kg/m      MEDICATIONS:   Current Outpatient " Medications   Medication Sig Dispense Refill     phentermine (ADIPEX-P) 37.5 MG tablet Take 0.5 tablets (18.75 mg) by mouth every morning (before breakfast) 30 tablet 2     Semaglutide-Weight Management (WEGOVY) 0.25 MG/0.5ML SOAJ Inject 0.25 mg Subcutaneous once a week 2 mL 0     Semaglutide-Weight Management (WEGOVY) 0.5 MG/0.5ML SOAJ Inject 0.5 mg Subcutaneous once a week After 4 week course of 0.25mg is complete. (Patient not taking: Reported on 5/13/2022) 2 mL 0       Weight Loss Medication History Reviewed With Patient 5/13/2022   Which weight loss medications are you currently taking on a regular basis?  Phentermine   If you are not taking a weight loss medication that was prescribed to you, please indicate why: -   Are you having any side effects from the weight loss medication that we have prescribed you? Yes   If you are having side effects please describe: More bloated and tired with Wegovy       ASSESSMENT:     Class 2 obesity with comorbidities     Plans--  --RTC in about 2-3 mo; will place more refills on the 0.25mg Wegovy pens in case there is a lapse in availability of the 0.5mg pens and she can stay on 0.25mg/wk until the 0.5mg pens come online again  --encouraged to check BP/P 1-2 times per week and let us know if P >100 and/or BP> 140/90 (taking phentermine @ 1/2 tablet regularly and without any side effects)             Time: approx 40 min spent on evaluation, management, counseling, education, & motivational interviewing (video) combined with previsit prep and post visit follow up care/charting same day.    Sincerely,    Tex Alonso MD          Again, thank you for allowing me to participate in the care of your patient.      Sincerely,    Tex Alonso MD

## 2022-05-13 NOTE — NURSING NOTE
"(   Chief Complaint   Patient presents with     RECHECK     Return MWM    )    ( Weight: 98.5 kg (217 lb 3.2 oz) (Patient reported) )  ( Height: 165.1 cm (5' 5\") )  ( BMI (Calculated): 36.14 )  (   )  (   )  (   )  (   )  (   )  (   )    (   )  (   )  (   )  (   )  (   )  (   )  (   )    (   Patient Active Problem List   Diagnosis     Class 2 obesity    )  (   Current Outpatient Medications   Medication Sig Dispense Refill     phentermine (ADIPEX-P) 37.5 MG tablet Take 0.5 tablets (18.75 mg) by mouth every morning (before breakfast) 30 tablet 2     Semaglutide-Weight Management (WEGOVY) 0.25 MG/0.5ML SOAJ Inject 0.25 mg Subcutaneous once a week 2 mL 0     Semaglutide-Weight Management (WEGOVY) 0.5 MG/0.5ML SOAJ Inject 0.5 mg Subcutaneous once a week After 4 week course of 0.25mg is complete. (Patient not taking: Reported on 5/13/2022) 2 mL 0    )  ( Diabetes Eval:    )    ( Pain Eval:  No Pain (0) )    ( Wound Eval:       )    (   History   Smoking Status     Never Smoker   Smokeless Tobacco     Never Used    )    ( Signed By:  Deirdre Mario, EMT; May 13, 2022; 8:09 AM )    "

## 2022-05-21 ENCOUNTER — HEALTH MAINTENANCE LETTER (OUTPATIENT)
Age: 37
End: 2022-05-21

## 2022-06-06 ENCOUNTER — TELEPHONE (OUTPATIENT)
Dept: ENDOCRINOLOGY | Facility: CLINIC | Age: 37
End: 2022-06-06
Payer: COMMERCIAL

## 2022-06-06 DIAGNOSIS — E66.01 CLASS 2 SEVERE OBESITY DUE TO EXCESS CALORIES WITH SERIOUS COMORBIDITY AND BODY MASS INDEX (BMI) OF 36.0 TO 36.9 IN ADULT (H): Primary | ICD-10-CM

## 2022-06-06 DIAGNOSIS — E66.812 CLASS 2 SEVERE OBESITY DUE TO EXCESS CALORIES WITH SERIOUS COMORBIDITY AND BODY MASS INDEX (BMI) OF 36.0 TO 36.9 IN ADULT (H): Primary | ICD-10-CM

## 2022-06-06 NOTE — TELEPHONE ENCOUNTER
Chuy called to say that [facility], no estimated date for supply.    Wants to know alternative    [phone]  **OK to leave detailed VM

## 2022-06-07 ENCOUNTER — TELEPHONE (OUTPATIENT)
Dept: ENDOCRINOLOGY | Facility: CLINIC | Age: 37
End: 2022-06-07

## 2022-06-07 NOTE — TELEPHONE ENCOUNTER
Called and left message for patient in regards to Wegovy. Advised that Dr. Alonso has sent in an alternative prescription for Saxenda. Mychart message sent to patient with additional information. Left call back number for further questions.

## 2022-06-07 NOTE — TELEPHONE ENCOUNTER
Prior Authorization Retail Medication Request    Medication/Dose: Saxenda  ICD code (if different than what is on RX):  Class 2 severe obesity due to excess calories with serious comorbidity and body mass index (BMI) of 36.0 to 36.9 in adult (H) [E66.01, Z68.36]  - Primary     Previously Tried and Failed:  History of diet and exercise, Metformin   Rationale:  I had the pleasure of seeing your patient Matt Gao. She is a 36 year old female who I am continuing to see for treatment of obesity. Started on Wegovy and is now unavailable. Switching to Saxenda.     --not on the metformin (had GI issues). We have reviewed that we could restart metformin but more gradually advance if/as tolerated but this is not as likely to help with wt loss as phentermine or semaglutide. Metformin could help with fertility if PCOS is an underlying issue.  --Currently taking 1/2 of a phentermine tablet daily without any issues, and noting some appetite suppression with this.      --She is working on healthy eating/activity exercise again also  --food plans: working on smaller portions, more nonstarchy veggies and less starches/sugars, more protein     --Since starting the semaglutide she had some nausea and a little fatigue but better now. Noticing some appetite suppression now some with current med combination. Encouraged to cut portions/stop when full to help prevent med side effects    CURRENT WEIGHT:   217 lbs 3.2 oz   Body mass index is 36.14 kg/m .     Initial Weight (lbs): 205 lbs  Last Visits Weight: 99.8 kg (220 lb)  Cumulative weight loss (lbs): -12.2  Weight Loss Percentage: -5.95%    Insurance Name:    Insurance ID:        Pharmacy Information (if different than what is on RX)  [id number] [place] [facility] [place], [place] - [address]  Phone:  [phone]

## 2022-06-09 NOTE — TELEPHONE ENCOUNTER
Central Prior Authorization Team   Phone: [phone]    PA Initiation    Medication: Saxenda-PA Initiated  Insurance Company: [employer] - Phone [phone] Fax [phone]  Pharm[facility]illing [id number] [place] - [address]  Filling Pharmacy Phone: [phone]  Filling Pharmacy Fax:    Start Date: 6/9/2022

## 2022-06-10 NOTE — TELEPHONE ENCOUNTER
Prior Authorization Not Needed-Patient is inactive with the insurance pharmacy has on file. Patient must provide current insurance information for pharmacy to bill and determine if this will need a PA. Pharmacy states last paid claim with insurance plan was in April.     Medication: Saxenda-PA Not Needed  Insurance Company: [employer] - Phone [phone] Fax [phone]  Expected CoPay:      Ph[id number] [place] [facility] [place], [place] - [address] [place] [address]  Pharmacy Notified: Yes  Patient Notified:

## 2022-06-17 ENCOUNTER — VIRTUAL VISIT (OUTPATIENT)
Dept: ENDOCRINOLOGY | Facility: CLINIC | Age: 37
End: 2022-06-17
Payer: COMMERCIAL

## 2022-06-17 DIAGNOSIS — E66.812 CLASS 2 SEVERE OBESITY DUE TO EXCESS CALORIES WITH SERIOUS COMORBIDITY IN ADULT, UNSPECIFIED BMI (H): Primary | ICD-10-CM

## 2022-06-17 DIAGNOSIS — E66.01 CLASS 2 SEVERE OBESITY DUE TO EXCESS CALORIES WITH SERIOUS COMORBIDITY IN ADULT, UNSPECIFIED BMI (H): Primary | ICD-10-CM

## 2022-06-17 PROCEDURE — 99215 OFFICE O/P EST HI 40 MIN: CPT | Mod: GT | Performed by: INTERNAL MEDICINE

## 2022-06-17 RX ORDER — PHENTERMINE HYDROCHLORIDE 37.5 MG/1
0.5 TABLET ORAL
Qty: 30 TABLET | Refills: 3 | Status: SHIPPED | OUTPATIENT
Start: 2022-06-17 | End: 2023-02-03

## 2022-06-17 NOTE — LETTER
Date:June 20, 2022      Patient was self referred, no letter generated. Do not send.        [facility] Health Information

## 2022-06-17 NOTE — LETTER
"2022       RE: Matt Gao  [address]  [place] [address]     Dear Colleague,    Thank you for referring your patient, Matt Gao, to the [facility] [place] at [facility]. Please see a copy of my visit note below.      Video visit 10:28-10:42; pt agrees to video visit  Platform [facility] Medical Weight Management Note     Matt Gao  MRN:  [id number]  :  1985  FADI:  2022    Dear Physician No Ref-Primary,    I had the pleasure of seeing your patient Matt Gao. She is a 36 year old female who I am continuing to see for treatment of obesity related to:       4/10/2019   I have the following health issues associated with obesity: High Cholesterol   I have the following symptoms associated with obesity: Knee Pain       INTERVAL HISTORY:    --she was last seen about a month ago. Reviewed and relevant history with regards to earlier treatment is as follows, as extracted from earlier note--  \"      Class 2 Obesity     2021: My first time meeting Ms Gao, previously she had seen Dr. Alonso. She is trying to get pregnant. We will start metformin and semaglutide.      Aug 2021: Was only able to start the metformin. Takes 3 pills at once. Discussed taking metformin twice daily instead. Will start phentermine. Discussed health coaching. She will look into it through her job.    \"     Since then she notes the following--  --not on the metformin (had GI issues). We have reviewed that we could restart metformin but more gradually advance if/as tolerated but this is not as likely to help with wt loss as phentermine or semaglutide. Metformin could help with fertility if PCOS is an underlying issue.     --She is working on healthy eating/activity exercise again also  --working on portion reduction (emphasizing veggies but allowing some carbs, usually healthy whole " food carbs), lean protein with meals  --with activity--working on MWF at gym with      With meds--  --Since starting the semaglutide she had some nausea and a little fatigue but got better, then we needed to change to Saxenda daily d/t lack of Wegovy supply  --sent Saxenda ([facility]); she will need to update insurance information at your pharmacy to get this prescription processed apparently (review of chart notes)  --phentermine is continuing helping with appetite suppression (tracking BP, was nl;will recheck soon with her PCP at routine appointment)    --pregnancy work up--had series of tests done, not yet pursuing IVF but working on wt loss to help improve chance of pregnancy         LAST WEIGHT:   217 lbs 3.2 oz   Body mass index is 36.14 kg/m .     Initial Weight (lbs): 205 lbs          CURRENT WEIGHT:    Saturday--208#      Wt Readings from Last 3 Encounters:   05/13/22 98.5 kg (217 lb 3.2 oz)   04/08/22 99.8 kg (220 lb)   02/03/22 99.8 kg (220 lb)             Changes and Difficulties 5/13/2022   I have made the following changes to my diet since my last visit: Cut down on portions, added more vegetables   With regards to my diet, I am still struggling with: Nothing   I have made the following changes to my activity/exercise since my last visit: Exercising 3x per week   With regards to my activity/exercise, I am still struggling with: Difficult to plan with variable work schedule       VITALS:  There were no vitals taken for this visit.    MEDICATIONS:   Current Outpatient Medications   Medication Sig Dispense Refill     liraglutide - Weight Management (SAXENDA) 18 MG/3ML pen Week one--0.6 mg daily; week two--1.2 mg daily; week three--1.8 mg daily; week four--2.4 mg daily; week five and after, as tolerated, 3 mg daily 15 mL 5     phentermine (ADIPEX-P) 37.5 MG tablet Take 0.5 tablets (18.75 mg) by mouth every morning (before breakfast) 30 tablet 2     Semaglutide-Weight Management (WEGOVY) 0.25  MG/0.5ML SOAJ Inject 0.25 mg Subcutaneous once a week 2 mL 3     Semaglutide-Weight Management (WEGOVY) 0.5 MG/0.5ML SOAJ Inject 0.5 mg Subcutaneous once a week After 4 week course of 0.25mg is complete. (Patient not taking: Reported on 5/13/2022) 2 mL 0       Weight Loss Medication History Reviewed With Patient 5/13/2022   Which weight loss medications are you currently taking on a regular basis?  Phentermine   If you are not taking a weight loss medication that was prescribed to you, please indicate why: -   Are you having any side effects from the weight loss medication that we have prescribed you? Yes   If you are having side effects please describe: More bloated and tired with Wegovy       ASSESSMENT:      Class 2 obesity with comorbidities     Plans--    --sent Saxenda ([facility]); she will need to update insurance information at pharmacy to get this prescription processed apparently (review of chart notes post visit)  --phentermine is helping with appetite suppression (tracking BP, was nl; she will recheck soon with her PCP at routine appointment) (continue current dose)  --working on portion control (emphasizing veggies but allowing some carbs, usually healthy whole food carbs), lean protein with meals  --with activity--working on MWF at gym with         Time: 42 min spent on evaluation, management, counseling, education, & motivational interviewing (video), combined with previsit prep and post visit follow up care/charting same day    Sincerely,    Tex Alonso MD      Again, thank you for allowing me to participate in the care of your patient.      Sincerely,    Tex Alonso MD

## 2022-06-17 NOTE — PROGRESS NOTES
"  Video visit 10:28-10:42; pt agrees to video visit  Platform [facility] Medical Weight Management Note     Matt Gao  MRN:  [id number]  :  1985  FADI:  2022    Dear Physician No Ref-Primary,    I had the pleasure of seeing your patient Matt Gao. She is a 36 year old female who I am continuing to see for treatment of obesity related to:       4/10/2019   I have the following health issues associated with obesity: High Cholesterol   I have the following symptoms associated with obesity: Knee Pain       INTERVAL HISTORY:    --she was last seen about a month ago. Reviewed and relevant history with regards to earlier treatment is as follows, as extracted from earlier note--  \"      Class 2 Obesity     2021: My first time meeting Ms Gao, previously she had seen Dr. Alonso. She is trying to get pregnant. We will start metformin and semaglutide.      Aug 2021: Was only able to start the metformin. Takes 3 pills at once. Discussed taking metformin twice daily instead. Will start phentermine. Discussed health coaching. She will look into it through her job.    \"     Since then she notes the following--  --not on the metformin (had GI issues). We have reviewed that we could restart metformin but more gradually advance if/as tolerated but this is not as likely to help with wt loss as phentermine or semaglutide. Metformin could help with fertility if PCOS is an underlying issue.     --She is working on healthy eating/activity exercise again also  --working on portion reduction (emphasizing veggies but allowing some carbs, usually healthy whole food carbs), lean protein with meals  --with activity--working on MWF at gym with      With meds--  --Since starting the semaglutide she had some nausea and a little fatigue but got better, then we needed to change to Saxenda daily d/t lack of Wegovy supply  --sent Saxenda ([facility]); she will need to update insurance " information at your pharmacy to get this prescription processed apparently (review of chart notes)  --phentermine is continuing helping with appetite suppression (tracking BP, was nl;will recheck soon with her PCP at routine appointment)    --pregnancy work up--had series of tests done, not yet pursuing IVF but working on wt loss to help improve chance of pregnancy         LAST WEIGHT:   217 lbs 3.2 oz   Body mass index is 36.14 kg/m .     Initial Weight (lbs): 205 lbs          CURRENT WEIGHT:    Saturday--208#      Wt Readings from Last 3 Encounters:   05/13/22 98.5 kg (217 lb 3.2 oz)   04/08/22 99.8 kg (220 lb)   02/03/22 99.8 kg (220 lb)             Changes and Difficulties 5/13/2022   I have made the following changes to my diet since my last visit: Cut down on portions, added more vegetables   With regards to my diet, I am still struggling with: Nothing   I have made the following changes to my activity/exercise since my last visit: Exercising 3x per week   With regards to my activity/exercise, I am still struggling with: Difficult to plan with variable work schedule       VITALS:  There were no vitals taken for this visit.    MEDICATIONS:   Current Outpatient Medications   Medication Sig Dispense Refill     liraglutide - Weight Management (SAXENDA) 18 MG/3ML pen Week one--0.6 mg daily; week two--1.2 mg daily; week three--1.8 mg daily; week four--2.4 mg daily; week five and after, as tolerated, 3 mg daily 15 mL 5     phentermine (ADIPEX-P) 37.5 MG tablet Take 0.5 tablets (18.75 mg) by mouth every morning (before breakfast) 30 tablet 2     Semaglutide-Weight Management (WEGOVY) 0.25 MG/0.5ML SOAJ Inject 0.25 mg Subcutaneous once a week 2 mL 3     Semaglutide-Weight Management (WEGOVY) 0.5 MG/0.5ML SOAJ Inject 0.5 mg Subcutaneous once a week After 4 week course of 0.25mg is complete. (Patient not taking: Reported on 5/13/2022) 2 mL 0       Weight Loss Medication History Reviewed With Patient 5/13/2022   Which  weight loss medications are you currently taking on a regular basis?  Phentermine   If you are not taking a weight loss medication that was prescribed to you, please indicate why: -   Are you having any side effects from the weight loss medication that we have prescribed you? Yes   If you are having side effects please describe: More bloated and tired with Wegovy       ASSESSMENT:      Class 2 obesity with comorbidities     Plans--    --sent Saxenda ([facility]); she will need to update insurance information at pharmacy to get this prescription processed apparently (review of chart notes post visit)  --phentermine is helping with appetite suppression (tracking BP, was nl; she will recheck soon with her PCP at routine appointment) (continue current dose)  --working on portion control (emphasizing veggies but allowing some carbs, usually healthy whole food carbs), lean protein with meals  --with activity--working on MWF at gym with         Time: 42 min spent on evaluation, management, counseling, education, & motivational interviewing (video), combined with previsit prep and post visit follow up care/charting same day    Sincerely,    Tex Alonso MD

## 2022-06-18 NOTE — PATIENT INSTRUCTIONS
Thank you for allowing us the privilege of caring for you. We hope we provided you with the excellent service you deserve.    Please let us know if there is anything else we can do for you so that we can be sure you are completely satisfied with your care experience.    Your visit was with Dr. Alonso today.    Instructions per today's visit:  --sent Saxenda ([facility]); please contact your pharmacy to update insurance information at your pharmacy to get this prescription processed apparently; let us know if any additional questions or issues remain with getting this  --phentermine is helping with appetite suppression--you are tracking BP, which has been ok; you plan to recheck soon with your primary care provider at routine appointment; please send those readings to us in a [employer] message or call with them  --food goals--continue working on portion reduction (emphasizing eating veggies but allowing some carbs in moderation, opting for healthy whole food carbs, and lean protein with meals)  --with activity--working on MWF at gym with     We can plan on return with Dr. Alonso in about 2-3 mo    Please call our contact center at [phone] to schedule your next appointments.    Meal Replacement Products:    Here is the link to our new e-store where you can purchase our meal replacement products    [email or web address]    The one week starter kit is a great way to sample a variety of products and see what works for you.    If you want more information about the product go to: [employer]    Free Shipping for orders over $75    Benefits of meal replacements products:    Portion and calorie control  Improved nutrition  Structured eating  Simplified food choices  Avoid contact with trigger foods    Interested in working with a health ?  Health coaches work with you to improve your overall health and wellbeing.  They look at the whole person, and may  involve discussion of different areas of life, including, but not limited to the four pillars of health (sleep, exercise, nutrition, and stress management). Discuss with your care team if you would like to start working a health .    Health Coaching-3 Pack: Schedule by calling [phone]    $99 for three health coaching visits    Visits may be done in person or via phone    Coaching is a partnership between the  and the client; Coaches do not prescribe or diagnose    Coaching helps inspire the client to reach his/her personal goals    Bluetooth Scale:    We hope to provide you with high quality telephone and virtual healthcare visits while social distancing for COVID-19 is necessary, as well as in the future when virtual visits may be more convenient for you.    Our technology team made it possible for Bluetooth scales to send weight measurements to our electronic medical record. This allows weights from you weighing at home to securely flow into the medical record, which will improve telephone and virtual visits.  Additionally, studies have shown that adults actually lose more weight when their weights are automatically sent to someone else, and also that this process is not stressful for those adults.    Below is a link for purchasing the scale, with a discount code for our patients. You may call your insurance company to see if they will reimburse you for the cost of the scale, as a piece of durable medical equipment. The scales only go up to a weight of 400 pounds. This is an issue and we are working with the developer on increasing this. We found no scales that go over 400lb that have blue-tooth for connecting to Sofea.    Scale to purchase: the ReqSpot.com  Body  Scale: [email or web address]/[email or web address]=aw.ds    Discount Code: We have a discount code for our patients to bring the cost down to $50, the code is:   withmed     Steps to link the scale to Reapplixt via an Android Phone (you can always disconnect at any point in the future):  1. The order must be placed first before the patient can access Track My Health within MyChart.  2. Download Google Fit frank from the Google Play Store  a. Log in or register using your Google account  3. Download the MyChart frank from Google Play Store  a. Select add organization  b. Search for Brightpearl and select it  c. Log into Reapplixt  d. Select Track My Health  e. Select the green connect my account button  f. When prompted log into your Google account  g. Select okay to confirm the account  4. Download the Withings Health Mate frank from Google Play Store  a. [place] for [employer]  b. Go to profile  c. Tap google fit under the Apps section  d. Select the option to activate Google Fit integration  e. Select the same Google account  f. Select okay to confirm the account  g.  Steps to link the scale to Reapplixt via an iPhone (you can always disconnect at any point in the future):  **Note Shop Hers is not available for download on an iPad**  1. The order must be placed first before the patient can access Track My Health within Reapplixt.  2. Locate the Health frank on your iPhone.  a. Set up your Apple Health account as prompted  b. The Sources page will show Apps that communicate with your Health frank. Once all steps are completed, you should see Health MyDentist and MyChart listed under the Apps section and your iPhone under the devices section.  i. Select Health Mate  1. Under 'ALLOW  HEALTH MATE  TO WRITE DATA ensure the toggle is on for Weight.  2. This will allow the scale to add your weight to the Apple Health  ii. Select MyChart  1. Under 'ALLOW  MYCHART  TO READ DATA ensure the toggle is on for Weight.  2. This allows 10X10 Roomhart to grab the weight from Shop Hers so your provider can see your weights.  3. Download the MyChart frank from the Frank Store  a. Select add organization                                                   b. Search for Tu Closet Mi Closet and select it  c. Log into Newton Insight  d. Select Track My Health  e. Select the green connect my account button  f. Follow prompts to link your device to Newton Insight.  4. Download the Withings health mate frank in the Frank Store  a. [place] for [email or web address]  b. Go to profile  c. Tap Health under the Apps section  d. If prompted to allow access with the Health Frank, toggle weight on for read and write access.      For any questions/concerns contact Helena Beckwith LPN at [phone]    To schedule appointments with our team, please call [phone]    Please call during clinic hours Monday through Friday 8:00a - 4:00p if you have questions or you can contact us via [employer] Insight at anytime.      Lab results will be communicated through My Chart or letter (if My Chart not used). Please call the clinic if you have not received communication after 1 week or if you have any questions.    Clinic Fax: [phone]    Thank you,    Medical Weight Management Team      GLP-1 Medications    We are considering starting a GLP-1 (Glucagon-like Peptide-1) medication. Examples of this medication include Byetta, Bydureon, or Victoza, etc. The medication chosen will depend on insurance coverage, and can be changed to the medication that is covered under your plan. These medications work the same, in that they can help you lose weight and control your blood sugar. One of the ways it works is by slowing down the rate that food leaves your stomach. You feel arnold and will eat less.  It also helps regulate hormones that can help improve your blood sugars.    Usually short acting insulins or oral agents are stopped or decreased by the doctor at the appointment. Low blood sugars are rare but can happen if patients are on insulin or other oral agents. If you notice consistent low sugars or high sugars, your medication may need to be adjusted after your appointment. If this is the case, please call RN and  provide her your blood sugar record from the last 3-4 days. The nurse will get in touch with the doctor and call you back/Mychart message with recommendations. We tolerate high sugars for a bit, so if sugars are running 180-200, this is ok. As weight starts dropping the blood sugars should too. If readings are consistently over 200 for 1-2 weeks, then you should call the doctor/nurse.    GLP-1 medication we will change to for now:  (You were on Wegovy in this class but that is not currently available so this other related medication will be a substitute while the Wegovy is not available)    Saxenda: Starting dose is 0.6 mg daily for a week, then 1.2 mg for a week, and then 1.8 mg for 1 week, then 2.4 mg for a week, then 3 mg weekly thereafter, as tolerated. If you find a dose is working really well for cravings and appetite reduction you can stay at that dose rather than continuing to escalate. This medication is given daily. Pen needles need to be ordered also.    Side effects of GLP- Medications include: The most common side effects are all GI related and consist of: nausea, constipation, diarrhea, burping, or gassiness. Patients are advised to eat slowly and less, and nausea typically passes if people can stick it out.     The risk of pancreatitis (inflammation of the pancreas) has been associated with this type of medication, but is very rare.  If you have had pancreatitis in the past, this medication may not be for you. Please let us know about any past history of pancreas problems.      Symptoms of pancreatitis include: Pain in your upper stomach area which  may travel to your back and be worse after eating. Your stomach area may be tender to the touch.  You may have vomiting or nausea and/or have a fever. If you should develop any of these symptoms, stop the medication and contact your primary care doctor. They will do a blood test to check for pancreatitis.         There is a small chance you may have some  low blood sugar after taking the medication.   The signs of low blood sugar are:  Weakness  Shaky   Hungry  Sweating  Confusion      See below for ways to treat low blood sugar without adding in lots of extra calories.      Treating Low Blood Sugar    If you have symptoms of low blood sugar (sweating, shaking, dizzy, confused) eat 15 grams of carbs and wait 15 minutes:    Glucose Tabs are best for sugars under 70 -  Dex4 or BD Glucose tablets are good, you will need to take 3-4 of these to equal 15 grams.     One small box of raisins  4 oz fruit juice box or   cup fruit juice  1 small apple  1 small banana    cup canned fruit in water    English muffin or a slice of bread with jelly   1 low fat frozen waffle with sugar-free syrup    cup cottage cheese with   cup frozen or fresh blueberries  1 cup skim or low-fat milk    cup whole grain cereal  4-6 crackers such as Triscuits      This medication is usually covered by insurance for patients with a diagnosis of Type 2 Diabetes. Depending on insurance coverage, the medication may be changed to a different formulary, but they all work in the same way. Sometimes a prior authorization is required, which may take up to 1-2 weeks for an insurance company to make a decision if they will cover the medication. Please be patient, you will be notified either way.     For any questions or concerns please send a [employer] message to our team or call our weight management call center at [phone] during regular business hours. For questions during evenings or weekends your messages will be addressed during the next business day.  For emergencies please call 911 or seek immediate medical care.     (Do not stop taking it if you don't think it's working. For some people it works without them knowing it.)     In order to get refills of this or any medication we prescribe you must be seen in the medical weight mgmt clinic every 2-4 months. Please have your pharmacy fax a refill request  to [phone].

## 2022-06-20 ENCOUNTER — TELEPHONE (OUTPATIENT)
Dept: ENDOCRINOLOGY | Facility: CLINIC | Age: 37
End: 2022-06-20
Payer: COMMERCIAL

## 2022-06-20 NOTE — TELEPHONE ENCOUNTER
Sent [employer] message to pt 6/20 to ask for pharmacy insurance coverage. Will need that information to run test claim to see if saxenda is covered.

## 2022-07-14 DIAGNOSIS — E66.812 CLASS 2 SEVERE OBESITY DUE TO EXCESS CALORIES WITH SERIOUS COMORBIDITY IN ADULT, UNSPECIFIED BMI (H): Primary | ICD-10-CM

## 2022-07-14 DIAGNOSIS — E66.01 CLASS 2 SEVERE OBESITY DUE TO EXCESS CALORIES WITH SERIOUS COMORBIDITY IN ADULT, UNSPECIFIED BMI (H): Primary | ICD-10-CM

## 2022-07-16 NOTE — TELEPHONE ENCOUNTER
Pen needle    6/17/2022  [facility] Tex Regan MD    Endocrinology, Diabetes, and Metabolism

## 2022-09-02 ENCOUNTER — TELEPHONE (OUTPATIENT)
Dept: ENDOCRINOLOGY | Facility: CLINIC | Age: 37
End: 2022-09-02

## 2022-09-02 NOTE — TELEPHONE ENCOUNTER
LM for patient regarding video appointment today with Dr. Alonso.    Call back number was provided.

## 2022-09-02 NOTE — TELEPHONE ENCOUNTER
2nd attempt to reach patient in regards to video visit appointment with .    Call back number was provided.

## 2022-09-18 ENCOUNTER — HEALTH MAINTENANCE LETTER (OUTPATIENT)
Age: 37
End: 2022-09-18

## 2023-02-03 ENCOUNTER — VIRTUAL VISIT (OUTPATIENT)
Dept: ENDOCRINOLOGY | Facility: CLINIC | Age: 38
End: 2023-02-03
Payer: COMMERCIAL

## 2023-02-03 VITALS — BODY MASS INDEX: 36.79 KG/M2 | HEIGHT: 65 IN | WEIGHT: 220.8 LBS

## 2023-02-03 DIAGNOSIS — E66.812 CLASS 2 SEVERE OBESITY DUE TO EXCESS CALORIES WITH SERIOUS COMORBIDITY AND BODY MASS INDEX (BMI) OF 36.0 TO 36.9 IN ADULT (H): Primary | ICD-10-CM

## 2023-02-03 DIAGNOSIS — E66.01 CLASS 2 SEVERE OBESITY DUE TO EXCESS CALORIES WITH SERIOUS COMORBIDITY AND BODY MASS INDEX (BMI) OF 36.0 TO 36.9 IN ADULT (H): Primary | ICD-10-CM

## 2023-02-03 PROCEDURE — 99214 OFFICE O/P EST MOD 30 MIN: CPT | Mod: GT | Performed by: INTERNAL MEDICINE

## 2023-02-03 RX ORDER — SEMAGLUTIDE 0.25 MG/.5ML
0.25 INJECTION, SOLUTION SUBCUTANEOUS WEEKLY
Qty: 2 ML | Refills: 3 | Status: SHIPPED | OUTPATIENT
Start: 2023-02-03

## 2023-02-03 RX ORDER — PHENTERMINE HYDROCHLORIDE 37.5 MG/1
0.5 TABLET ORAL
Qty: 45 TABLET | Refills: 1 | Status: SHIPPED | OUTPATIENT
Start: 2023-02-03

## 2023-02-03 ASSESSMENT — PAIN SCALES - GENERAL: PAINLEVEL: NO PAIN (0)

## 2023-02-03 NOTE — LETTER
Date:February 6, 2023      Patient was self referred, no letter generated. Do not send.        [facility] Health Information

## 2023-02-03 NOTE — LETTER
"2/3/2023       RE: Matt Gao  [address]  [place] [address]     Dear Colleague,    Thank you for referring your patient, Matt Gao, to the [facility] [place] at [facility]. Please see a copy of my visit note below.    VINCE is a 37 year old who is being evaluated via a billable video visit.      How would you like to obtain your AVS? MyChart  If the video visit is dropped, the invitation should be resent by: Text to cell phone: [phone]  Will anyone else be joining your video visit? No   During this virtual visit the patient is located in , patient verifies this as the location during the entirety of this visit.     During this virtual visit the patient is located in [place], patient verifies this as the location during the entirety of this visit.     Video-Visit Details    Type of service:  Video Visit     Originating Location (pt. Location): Other at work on break   Distant Location (provider location):  Off-site  Platform used for Video Visit: [employer]     Video start time--11:04, end time--11:18    Return Medical Weight Management Note     Matt Gao  MRN:  [id number]  :  1985  FADI:  2/3/2023    Dear Physician No Ref-Primary,    I had the pleasure of seeing your patient Matt Gao. She is a 37 year old female who I am continuing to see for treatment of obesity related to:       4/10/2019   I have the following health issues associated with obesity: High Cholesterol   I have the following symptoms associated with obesity: Knee Pain       INTERVAL HISTORY:    --she was last seen about 5 mo ago. Reviewed and relevant history with regards to earlier treatment is as follows, as extracted from earlier note--  \"        Class 2 Obesity     2021: My first time meeting Ms Gao, previously she had seen Dr. Alonso. She is trying to get pregnant. We will start metformin and " "semaglutide.      Aug 2021: Was only able to start the metformin. Takes 3 pills at once. Discussed taking metformin twice daily instead. Will start phentermine. Discussed health coaching. She will look into it through her job.    \"        Last visit, with meds--  --Since starting the semaglutide she had some nausea and a little fatigue but got better, then we needed to change to Saxenda daily d/t lack of Wegovy supply  --phentermine was continuing helping with appetite suppression (tracking BP, was nl)     --pregnancy work up--had series of tests done, was not yet pursuing IVF but working on wt loss to help improve chance of pregnancy           Since last seen--  --fibroid surgery recently  --has started a low carb diet    --goals with food--  Meal prepping  --High protein breakfast (such as eggs)/Gr yogurt; with other meals also emphasizing lean protein  --avoiding snacking    There has been a lot of stress in the interim/travel also death of family member in [place]    Some gaps in pharm support in the interim  --has restarted phentermine now (back on it for about a wk but needs renewal)    CURRENT WEIGHT:   220 lbs 12.8 oz   Body mass index is 36.74 kg/m .    Initial Weight (lbs): 205 lbs  Last Visits Weight: 98.5 kg (217 lb 3.2 oz)  Cumulative weight loss (lbs): -15.8  Weight Loss Percentage: -7.71%    Changes and Difficulties 5/13/2022   I have made the following changes to my diet since my last visit: Cut down on portions, added more vegetables   With regards to my diet, I am still struggling with: Nothing   I have made the following changes to my activity/exercise since my last visit: Exercising 3x per week   With regards to my activity/exercise, I am still struggling with: Difficult to plan with variable work schedule       VITALS:  Ht 1.651 m (5' 5\")   Wt 100.2 kg (220 lb 12.8 oz)   BMI 36.74 kg/m      MEDICATIONS:   Current Outpatient Medications   Medication Sig Dispense Refill     insulin pen needle " (32G X 4 MM) 32G X 4 MM miscellaneous Use  1 pen needles daily or as directed. (Patient not taking: Reported on 2/3/2023) 100 each 1     liraglutide - Weight Management (SAXENDA) 18 MG/3ML pen Week one--0.6 mg daily; week two--1.2 mg daily; week three--1.8 mg daily; week four--2.4 mg daily; week five and after, as tolerated, 3 mg daily (Patient not taking: Reported on 2/3/2023) 15 mL 5     phentermine (ADIPEX-P) 37.5 MG tablet Take 0.5 tablets (18.75 mg) by mouth every morning (before breakfast) (Patient not taking: Reported on 2/3/2023) 30 tablet 3     Semaglutide-Weight Management (WEGOVY) 0.25 MG/0.5ML SOAJ Inject 0.25 mg Subcutaneous once a week (Patient not taking: Reported on 2/3/2023) 2 mL 3       Weight Loss Medication History Reviewed With Patient 2/3/2023   Which weight loss medications are you currently taking on a regular basis?  None   If you are not taking a weight loss medication that was prescribed to you, please indicate why: -   Are you having any side effects from the weight loss medication that we have prescribed you? -   If you are having side effects please describe: ran out of Phentermine, Wegovy wasn't available last time I tried to , prefer Saxenda         ASSESSMENT:      Class 2 obesity with comorbidities     Plans--  --in support of healthy eating, lower carb/calorie goals  --follow ups: med management pharm in 1 mo; RTC with me in 2-3 mo  --will renew semaglutide (start back at 0.25 mg weekly) and phenteremine (1/2 adipex)        Time: 33 min spent on evaluation, management, counseling, education, & motivational interviewing (video), combined with previsit prep and post visit follow up care/charting same day    Sincerely,    Tex Alonso MD        Again, thank you for allowing me to participate in the care of your patient.      Sincerely,    Tex Alonso MD

## 2023-02-03 NOTE — PROGRESS NOTES
"VINCE is a 37 year old who is being evaluated via a billable video visit.      How would you like to obtain your AVS? MyChart  If the video visit is dropped, the invitation should be resent by: Text to cell phone: [phone]  Will anyone else be joining your video visit? No   During this virtual visit the patient is located in , patient verifies this as the location during the entirety of this visit.     During this virtual visit the patient is located in [place], patient verifies this as the location during the entirety of this visit.     Video-Visit Details    Type of service:  Video Visit     Originating Location (pt. Location): Other at work on break   Distant Location (provider location):  Off-site  Platform used for Video Visit: [employer]     Video start time--11:04, end time--11:18    Return Medical Weight Management Note     Matt Gao  MRN:  [id number]  :  1985  FADI:  2/3/2023    Dear Physician No Ref-Primary,    I had the pleasure of seeing your patient Matt Gao. She is a 37 year old female who I am continuing to see for treatment of obesity related to:       4/10/2019   I have the following health issues associated with obesity: High Cholesterol   I have the following symptoms associated with obesity: Knee Pain       INTERVAL HISTORY:    --she was last seen about 5 mo ago. Reviewed and relevant history with regards to earlier treatment is as follows, as extracted from earlier note--  \"        Class 2 Obesity     2021: My first time meeting Ms Gao, previously she had seen Dr. Alonso. She is trying to get pregnant. We will start metformin and semaglutide.      Aug 2021: Was only able to start the metformin. Takes 3 pills at once. Discussed taking metformin twice daily instead. Will start phentermine. Discussed health coaching. She will look into it through her job.    \"        Last visit, with meds--  --Since starting the semaglutide she had some nausea and a little fatigue " "but got better, then we needed to change to Saxenda daily d/t lack of Wegovy supply  --phentermine was continuing helping with appetite suppression (tracking BP, was nl)     --pregnancy work up--had series of tests done, was not yet pursuing IVF but working on wt loss to help improve chance of pregnancy           Since last seen--  --fibroid surgery recently  --has started a low carb diet    --goals with food--  Meal prepping  --High protein breakfast (such as eggs)/Gr yogurt; with other meals also emphasizing lean protein  --avoiding snacking    There has been a lot of stress in the interim/travel also death of family member in [place]    Some gaps in pharm support in the interim  --has restarted phentermine now (back on it for about a wk but needs renewal)    CURRENT WEIGHT:   220 lbs 12.8 oz   Body mass index is 36.74 kg/m .    Initial Weight (lbs): 205 lbs  Last Visits Weight: 98.5 kg (217 lb 3.2 oz)  Cumulative weight loss (lbs): -15.8  Weight Loss Percentage: -7.71%    Changes and Difficulties 5/13/2022   I have made the following changes to my diet since my last visit: Cut down on portions, added more vegetables   With regards to my diet, I am still struggling with: Nothing   I have made the following changes to my activity/exercise since my last visit: Exercising 3x per week   With regards to my activity/exercise, I am still struggling with: Difficult to plan with variable work schedule       VITALS:  Ht 1.651 m (5' 5\")   Wt 100.2 kg (220 lb 12.8 oz)   BMI 36.74 kg/m      MEDICATIONS:   Current Outpatient Medications   Medication Sig Dispense Refill     insulin pen needle (32G X 4 MM) 32G X 4 MM miscellaneous Use  1 pen needles daily or as directed. (Patient not taking: Reported on 2/3/2023) 100 each 1     liraglutide - Weight Management (SAXENDA) 18 MG/3ML pen Week one--0.6 mg daily; week two--1.2 mg daily; week three--1.8 mg daily; week four--2.4 mg daily; week five and after, as tolerated, 3 mg daily " (Patient not taking: Reported on 2/3/2023) 15 mL 5     phentermine (ADIPEX-P) 37.5 MG tablet Take 0.5 tablets (18.75 mg) by mouth every morning (before breakfast) (Patient not taking: Reported on 2/3/2023) 30 tablet 3     Semaglutide-Weight Management (WEGOVY) 0.25 MG/0.5ML SOAJ Inject 0.25 mg Subcutaneous once a week (Patient not taking: Reported on 2/3/2023) 2 mL 3       Weight Loss Medication History Reviewed With Patient 2/3/2023   Which weight loss medications are you currently taking on a regular basis?  None   If you are not taking a weight loss medication that was prescribed to you, please indicate why: -   Are you having any side effects from the weight loss medication that we have prescribed you? -   If you are having side effects please describe: ran out of Phentermine, Wegovy wasn't available last time I tried to , prefer Saxenda         ASSESSMENT:      Class 2 obesity with comorbidities     Plans--  --in support of healthy eating, lower carb/calorie goals  --follow ups: med management pharm in 1 mo; RTC with me in 2-3 mo  --will renew semaglutide (start back at 0.25 mg weekly) and phenteremine (1/2 adipex)        Time: 33 min spent on evaluation, management, counseling, education, & motivational interviewing (video), combined with previsit prep and post visit follow up care/charting same day    Sincerely,    Tex Alonso MD

## 2023-02-03 NOTE — NURSING NOTE
"Chief Complaint   Patient presents with     Follow Up     MWM return       Vitals:    02/03/23 1031   Weight: 100.2 kg (220 lb 12.8 oz)   Height: 1.651 m (5' 5\")       Body mass index is 36.74 kg/m .                              "

## 2023-02-03 NOTE — PATIENT INSTRUCTIONS
Thank you for allowing us the privilege of caring for you. We hope we provided you with the excellent service you deserve.    Please let us know if there is anything else we can do for you so that we can be sure you are completely satisfied with your care experience.    Your visit was with Dr. Alonso today.    Instructions per today's visit:  --we are restarting phentermine now (1/2 tablet)  --will renew semaglutide (start back at 0.25 mg weekly)  --returns: medication management pharmacist in 1 mo; return with me in 2-3 mo    Please call our contact center at [phone] to schedule your next appointments.    Meal Replacement Products:    Here is the link to our new e-store where you can purchase our meal replacement products    [email or web address]/store    The one week starter kit is a great way to sample a variety of products and see what works for you.    If you want more information about the product go to: [employer]    Free Shipping for orders over $75    Benefits of meal replacements products:    Portion and calorie control  Improved nutrition  Structured eating  Simplified food choices  Avoid contact with trigger foods    Interested in working with a health ?  Health coaches work with you to improve your overall health and wellbeing.  They look at the whole person, and may involve discussion of different areas of life, including, but not limited to the four pillars of health (sleep, exercise, nutrition, and stress management). Discuss with your care team if you would like to start working a health .    Health Coaching-3 Pack: Schedule by calling [phone]    $99 for three health coaching visits    Visits may be done in person or via phone    Coaching is a partnership between the  and the client; Coaches do not prescribe or diagnose    Coaching helps inspire the client to reach his/her personal goals    Bluetooth Scale:    We hope to  provide you with high quality telephone and virtual healthcare visits while social distancing for COVID-19 is necessary, as well as in the future when virtual visits may be more convenient for you.    Our technology team made it possible for Bluetooth scales to send weight measurements to our electronic medical record. This allows weights from you weighing at home to securely flow into the medical record, which will improve telephone and virtual visits.  Additionally, studies have shown that adults actually lose more weight when their weights are automatically sent to someone else, and also that this process is not stressful for those adults.    Below is a link for purchasing the scale, with a discount code for our patients. You may call your insurance company to see if they will reimburse you for the cost of the scale, as a piece of durable medical equipment. The scales only go up to a weight of 400 pounds. This is an issue and we are working with the developer on increasing this. We found no scales that go over 400lb that have blue-tooth for connecting to Tynt.    Scale to purchase: the clinovo  Body  Scale: [email or web address]/[email or web address]=aw.ds    Discount Code: We have a discount code for our patients to bring the cost down to $50, the code is:  withmed     Steps to link the scale to Tynt via an Android Phone (you can always disconnect at any point in the future):  1. The order must be placed first before the patient can access Track My Health within Tynt.  2. Download Google Fit eric from the Google Play Store  a. Log in or register using your Google account  3. Download the Tynt eric from Google Play Store  a. Select add organization  b. Search for [facility] and select it  c. Log into Tynt  d. Select Track My Health  e. Select the green connect my account button  f. When prompted log into your Google account  g. Select  okay to confirm the account  4. Download the [employer] Health Mate frank from Google Play Store  a. [place] for [employer]  b. Go to profile  c. Tap google fit under the Apps section  d. Select the option to activate Google Fit integration  e. Select the same Google account  f. Select okay to confirm the account  g.  Steps to link the scale to Re Pet via an iPhone (you can always disconnect at any point in the future):  **Note [employer] is not available for download on an iPad**  1. The order must be placed first before the patient can access Track My Health within Re Pet.  2. Locate the Health frank on your iPhone.  a. Set up your Apple Health account as prompted  b. The Sources page will show Apps that communicate with your Health frank. Once all steps are completed, you should see [employer] and [employer] listed under the Apps section and your iPhone under the devices section.  i. Select Health Mate  1. Under 'ALLOW  HEALTH [place]  TO WRITE DATA ensure the toggle is on for Weight.  2. This will allow the scale to add your weight to the Apple Health  ii. Select [employer]  1. Under 'ALLOW  Element ID  TO READ DATA ensure the toggle is on for Weight.  2. This allows Re Pet to grab the weight from [employer] so your provider can see your weights.  3. Download the Re Pet frank from the Frank Store  a. Select add organization                                                  b. Search for [employer] and select it  c. Log into Re Pet  d. Select Track My Health  e. Select the green connect my account button  f. Follow prompts to link your device to Re Pet.  4. Download the Withings health mate frank in the Frank Store  a. [place] for [employer]  b. Go to profile  c. Tap Health under the Apps section  d. If prompted to allow access with the Health Frank, toggle weight on for read and write access.      For any questions/concerns contact Helena Beckwith LPN at [phone]    To schedule appointments with our team, please call  [phone]    Please call during clinic hours Monday through Friday 8:00a - 4:00p if you have questions or you can contact us via [employer] at anytime.      Lab results will be communicated through My Chart or letter (if My Chart not used). Please call the clinic if you have not received communication after 1 week or if you have any questions.    Clinic Fax: [phone]    Thank you,  Medical Weight Management Team

## 2023-02-06 ENCOUNTER — TELEPHONE (OUTPATIENT)
Dept: ENDOCRINOLOGY | Facility: CLINIC | Age: 38
End: 2023-02-06
Payer: COMMERCIAL

## 2023-02-06 NOTE — TELEPHONE ENCOUNTER
Prior Authorization Retail Medication Request    Medication/Dose: Wegovy  ICD code (if different than what is on RX):  Class 2 severe obesity due to excess calories with serious comorbidity and body mass index (BMI) of 36.0 to 36.9 in adult (H) [E66.01, Z68.36]  - Primary     Previously Tried and Failed:  History of diet and exercise  Rationale:  I had the pleasure of seeing your patient Matt Gao. She is a 37 year old female who I am continuing to see for treatment of obesity related to: high cholesterol.     Last visit, with meds--  --Since starting the semaglutide she had some nausea and a little fatigue but got better, then we needed to change to Saxenda daily d/t lack of Wegovy supply  --phentermine was continuing helping with appetite suppression (tracking BP, was nl)    CURRENT WEIGHT:   220 lbs 12.8 oz   Body mass index is 36.74 kg/m .     Initial Weight (lbs): 205 lbs  Last Visits Weight: 98.5 kg (217 lb 3.2 oz)  Cumulative weight loss (lbs): -15.8  Weight Loss Percentage: -7.71%    Insurance Name:    Insurance ID:        Pharmacy Information (if different than what is on RX)  Name:  [id number] [place] [facility] [place] - [address] [place] [address]  Phone:  [phone]

## 2023-02-07 ENCOUNTER — TELEPHONE (OUTPATIENT)
Dept: ENDOCRINOLOGY | Facility: CLINIC | Age: 38
End: 2023-02-07
Payer: COMMERCIAL

## 2023-02-08 ENCOUNTER — TELEPHONE (OUTPATIENT)
Dept: ENDOCRINOLOGY | Facility: CLINIC | Age: 38
End: 2023-02-08
Payer: COMMERCIAL

## 2023-02-08 NOTE — TELEPHONE ENCOUNTER
VALARIE and sent myc for scheduling appt with medication management pharmacist Lauren Bloch in 1 mo; also schedule return with Dr. Alonso in 2-3 mo (April/May).

## 2023-03-22 ENCOUNTER — VIRTUAL VISIT (OUTPATIENT)
Dept: PHARMACY | Facility: CLINIC | Age: 38
End: 2023-03-22
Payer: COMMERCIAL

## 2023-03-22 DIAGNOSIS — E66.812 CLASS 2 OBESITY: Primary | ICD-10-CM

## 2023-03-22 PROCEDURE — 99207 PR NO CHARGE LOS: CPT | Performed by: PHARMACIST

## 2023-03-22 NOTE — PROGRESS NOTES
Medication Therapy Management (MTM) Encounter    ASSESSMENT:                            Medication Adherence/Access: No issues identified    Obesity: Patient would benefit from continuing Wegovy dose titration as side effects appears minimal or manageable. Discussed if has greater issues of hypophagia or food aversion with increasing dose to consider hold phentermine to see if issues resolve. Patient is agreeable to the plan.     PLAN:                            1. Increase Wegovy to 0.5 mg weekly for 4 weeks, then if tolerating without issues increase to 1 mg weekly. RXs sent into pharmacy  -Hold phentermine if you have greater issues of food aversions or such a decreased appetite that it is causing you to skip/miss meals.     Follow-up: Dr. Alonso in 2 months, Lauren Bloch MTM pharmacist as needed - Call [phone] to schedule if needed.     SUBJECTIVE/OBJECTIVE:                          Matt Gao is a 37 year old female called for an initial visit. She was referred to me from Dr. Alonso.      Reason for visit: Wegovy start follow up.    Allergies/ADRs: Reviewed in chart  Past Medical History: Reviewed in chart  Tobacco: She reports that she has never smoked. She has never used smokeless tobacco.  Alcohol: not currently using    Medication Adherence/Access: no issues reported    Obesity:   Phentermine 18.75 mg in AM   Wegovy 0.25 mg once weekly, 4th injection     Followed by Dr. Tex Alonso, seen 2/3/2023 for Return Medical Weight Management. Prescribed Wegovy at that time, phentermine continued. Was able to start Wegovy. Patient is experiencing the follow side effects: diarrhea with particular foods, has been limiting those foods because of this. Feels phentermine is helpful with minimizing her previous larger appetite in AM. Due for injection today, but unsure next steps to continue medication. She has been noticing smaller portions since starting Wegovy. She is trying to be more mindful of her new  "point of fullness. She reports over the last month she has been feeling better. She had felt that with weight gain over time that her legs and feet were hurting more, but hoping with weight loss that this will improve.   Diet/Eating Habits: Patient reports getting in 2 meals per day + 1 snack, previously was 3 meals per day prior to starting Wegovy. Sometimes she \"just doesn't want to eat\", but still eats something small.  She is trying to implement more protein at her meals and snacks. She is working on getting in more water intake, something she struggles with at times.   Exercise/Activity: Patient reports with winter that she is more sedentary. Used to be more active going to the gym, but with new role in career she is less active. With weather changing she plans to get in more walks. Goal 30 min-1 hour a couple times per week once weather is nicer.      Current weight today: 215 lb   Wt Readings from Last 4 Encounters:   02/03/23 220 lb 12.8 oz (100.2 kg)   05/13/22 217 lb 3.2 oz (98.5 kg)   04/08/22 220 lb (99.8 kg)   02/03/22 220 lb (99.8 kg)     Estimated body mass index is 36.74 kg/m  as calculated from the following:    Height as of 2/3/23: 5' 5\" (1.651 m).    Weight as of 2/3/23: 220 lb 12.8 oz (100.2 kg).  ----------------    I spent 15 minutes with this patient today. All changes were made via collaborative practice agreement with Dr. Torres. A copy of the visit note was provided to the patient's provider(s).    A summary of these recommendations are available via clinic portal.     Lauren Bloch, PharmD, BCACP   Medication Therapy Management Pharmacist   [facility]    Telemedicine Visit Details  Type of service:  Telephone visit  Start Time: 2:35 PM  End Time: 2:50 PM     Medication Therapy Recommendations  Class 2 obesity    Current Medication: Semaglutide-Weight Management (WEGOVY) 0.25 MG/0.5ML SOAJ (Discontinued)   Rationale: Dose too low - Dosage too low - " Effectiveness   Recommendation: Increase Dose - Wegovy 0.5 MG/0.5ML Soaj   Status: Accepted per CPA

## 2023-03-23 NOTE — PATIENT INSTRUCTIONS
"Recommendations from today's MTM visit:                                                    MTM (medication therapy management) is a service provided by a clinical pharmacist designed to help you get the most of out of your medicines.   Today we reviewed what your medicines are for, how to know if they are working, that your medicines are safe and how to make your medicine regimen as easy as possible.      1. Increase Wegovy to 0.5 mg weekly for 4 weeks, then if tolerating without issues increase to 1 mg weekly. RXs sent into pharmacy  -Hold phentermine if you have greater issues of food aversions or such a decreased appetite that it is causing you to skip/miss meals.     Follow-up: Dr. Alonso in 2 months, Lauren Bloch MTM pharmacist as needed - Call [phone] to schedule if needed.     It was great speaking with you today.  I value your experience and would be very thankful for your time in providing feedback in our clinic survey. In the next few days, you may receive an email or text message from [employer] with a link to a survey related to your  clinical pharmacist.\"     To schedule another appointment with your MTM pharmacist, please call [facility] Scheduling at [phone].     My Clinical Pharmacist's contact information:                                                      Please feel free to contact me with any questions or concerns you have.      Lauren Bloch, PharmD  Medication Therapy Management Pharmacist   [facility]             "

## 2023-05-15 ENCOUNTER — MYC REFILL (OUTPATIENT)
Dept: PHARMACY | Facility: CLINIC | Age: 38
End: 2023-05-15
Payer: COMMERCIAL

## 2023-05-15 DIAGNOSIS — E66.812 CLASS 2 OBESITY: ICD-10-CM

## 2023-05-21 NOTE — PROGRESS NOTES
"Disease State Management Encounter:                          Chuy Gonzalez is a 37 year old female called for for an initial visit. She was referred to me from Dr. Alonso.      Reason for visit: Wegovy start follow up.    Obesity:   Phentermine 18.75 mg in AM   Wegovy 0.25 mg once weekly, 4th injection     Followed by Dr. Tex Alonso, seen 2/3/2023 for [facility]. Prescribed Wegovy at that time, phentermine continued. Was able to start Wegovy. Patient is experiencing the follow side effects: diarrhea with particular foods, has been limiting those foods because of this. Feels phentermine is helpful with minimizing her previous larger appetite in AM. Due for injection today, but unsure next steps to continue medication. She has been noticing smaller portions since starting Wegovy. She is trying to be more mindful of her new point of fullness. She reports over the last month she has been feeling better. She had felt that with weight gain over time that her legs and feet were hurting more, but hoping with weight loss that this will improve.   Diet/Eating Habits: Patient reports getting in 2 meals per day + 1 snack, previously was 3 meals per day prior to starting Wegovy. Sometimes she \"just doesn't want to eat\", but still eats something small.  She is trying to implement more protein at her meals and snacks. She is working on getting in more water intake, something she struggles with at times.   Exercise/Activity: Patient reports with winter that she is more sedentary. Used to be more active going to the gym, but with new role in career she is less active. With weather changing she plans to get in more walks. Goal 30 min-1 hour a couple times per week once weather is nicer.      Current weight today: 215 lb   Wt Readings from Last 4 Encounters:   02/03/23 220 lb 12.8 oz (100.2 kg)   05/13/22 217 lb 3.2 oz (98.5 kg)   04/08/22 220 lb (99.8 kg)   02/03/22 220 lb (99.8 kg)     Estimated body mass " "index is 36.74 kg/m  as calculated from the following:    Height as of 2/3/23: 5' 5\" (1.651 m).    Weight as of 2/3/23: 220 lb 12.8 oz (100.2 kg).    Assessment/Plan:    Patient would benefit from continuing Wegovy dose titration as side effects appears minimal or manageable. Discussed if has greater issues of hypophagia or food aversion with increasing dose to consider hold phentermine to see if issues resolve. Patient is agreeable to the plan.     1. Increase Wegovy to 0.5 mg weekly for 4 weeks, then if tolerating without issues increase to 1 mg weekly. RXs sent into pharmacy  -Hold phentermine if you have greater issues of food aversions or such a decreased appetite that it is causing you to skip/miss meals.      Follow-up: Dr. Alonso in 2 months, Lauren Bloch [facility] pharmacist as needed - Call [phone] to schedule if needed.     I spent 15 minutes with this patient today. All changes were made via collaborative practice agreement with Dr. Alonso. A copy of the visit note was provided to the patient's provider(s).    A summary of these recommendations was sent via [employer].    Lauren Bloch, PharmD, BCACP   Medication Therapy Management Pharmacist   [facility]       Medication Therapy Recommendations  Class 2 obesity    Current Medication: Semaglutide-Weight Management (WEGOVY) 0.25 MG/0.5ML SOAJ (Discontinued)   Rationale: Dose too low - Dosage too low - Effectiveness   Recommendation: Increase Dose - Wegovy 0.5 MG/0.5ML Soaj   Status: Accepted per CPA                "

## 2023-06-04 ENCOUNTER — HEALTH MAINTENANCE LETTER (OUTPATIENT)
Age: 38
End: 2023-06-04

## 2024-07-14 ENCOUNTER — HEALTH MAINTENANCE LETTER (OUTPATIENT)
Age: 39
End: 2024-07-14

## 2025-07-19 ENCOUNTER — HEALTH MAINTENANCE LETTER (OUTPATIENT)
Age: 40
End: 2025-07-19